# Patient Record
Sex: FEMALE | Race: BLACK OR AFRICAN AMERICAN | NOT HISPANIC OR LATINO | Employment: PART TIME | URBAN - METROPOLITAN AREA
[De-identification: names, ages, dates, MRNs, and addresses within clinical notes are randomized per-mention and may not be internally consistent; named-entity substitution may affect disease eponyms.]

---
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
## 2022-04-10 ENCOUNTER — HISTORICAL (OUTPATIENT)
Dept: ADMINISTRATIVE | Facility: HOSPITAL | Age: 22
End: 2022-04-10
Payer: COMMERCIAL

## 2022-04-28 VITALS
BODY MASS INDEX: 41.95 KG/M2 | DIASTOLIC BLOOD PRESSURE: 78 MMHG | OXYGEN SATURATION: 99 % | WEIGHT: 293 LBS | SYSTOLIC BLOOD PRESSURE: 124 MMHG | HEIGHT: 70 IN

## 2022-05-16 ENCOUNTER — OFFICE VISIT (OUTPATIENT)
Dept: URGENT CARE | Facility: CLINIC | Age: 22
End: 2022-05-16
Payer: COMMERCIAL

## 2022-05-16 VITALS
WEIGHT: 293 LBS | OXYGEN SATURATION: 97 % | HEIGHT: 70 IN | BODY MASS INDEX: 41.95 KG/M2 | DIASTOLIC BLOOD PRESSURE: 88 MMHG | TEMPERATURE: 99 F | SYSTOLIC BLOOD PRESSURE: 120 MMHG | HEART RATE: 66 BPM | RESPIRATION RATE: 20 BRPM

## 2022-05-16 DIAGNOSIS — H60.333 ACUTE SWIMMER'S EAR OF BOTH SIDES: Primary | ICD-10-CM

## 2022-05-16 PROCEDURE — 1159F MED LIST DOCD IN RCRD: CPT | Mod: CPTII,,, | Performed by: NURSE PRACTITIONER

## 2022-05-16 PROCEDURE — 1160F RVW MEDS BY RX/DR IN RCRD: CPT | Mod: CPTII,,, | Performed by: NURSE PRACTITIONER

## 2022-05-16 PROCEDURE — 3074F SYST BP LT 130 MM HG: CPT | Mod: CPTII,,, | Performed by: NURSE PRACTITIONER

## 2022-05-16 PROCEDURE — 3008F PR BODY MASS INDEX (BMI) DOCUMENTED: ICD-10-PCS | Mod: CPTII,,, | Performed by: NURSE PRACTITIONER

## 2022-05-16 PROCEDURE — 3079F PR MOST RECENT DIASTOLIC BLOOD PRESSURE 80-89 MM HG: ICD-10-PCS | Mod: CPTII,,, | Performed by: NURSE PRACTITIONER

## 2022-05-16 PROCEDURE — 99213 OFFICE O/P EST LOW 20 MIN: CPT | Mod: S$PBB,,, | Performed by: NURSE PRACTITIONER

## 2022-05-16 PROCEDURE — 1160F PR REVIEW ALL MEDS BY PRESCRIBER/CLIN PHARMACIST DOCUMENTED: ICD-10-PCS | Mod: CPTII,,, | Performed by: NURSE PRACTITIONER

## 2022-05-16 PROCEDURE — 3074F PR MOST RECENT SYSTOLIC BLOOD PRESSURE < 130 MM HG: ICD-10-PCS | Mod: CPTII,,, | Performed by: NURSE PRACTITIONER

## 2022-05-16 PROCEDURE — 99214 OFFICE O/P EST MOD 30 MIN: CPT | Mod: PBBFAC | Performed by: NURSE PRACTITIONER

## 2022-05-16 PROCEDURE — 1159F PR MEDICATION LIST DOCUMENTED IN MEDICAL RECORD: ICD-10-PCS | Mod: CPTII,,, | Performed by: NURSE PRACTITIONER

## 2022-05-16 PROCEDURE — 3008F BODY MASS INDEX DOCD: CPT | Mod: CPTII,,, | Performed by: NURSE PRACTITIONER

## 2022-05-16 PROCEDURE — 99213 PR OFFICE/OUTPT VISIT, EST, LEVL III, 20-29 MIN: ICD-10-PCS | Mod: S$PBB,,, | Performed by: NURSE PRACTITIONER

## 2022-05-16 PROCEDURE — 3079F DIAST BP 80-89 MM HG: CPT | Mod: CPTII,,, | Performed by: NURSE PRACTITIONER

## 2022-05-16 RX ORDER — ACETIC ACID 20.65 MG/ML
4 SOLUTION AURICULAR (OTIC) 3 TIMES DAILY
Qty: 15 ML | Refills: 0 | Status: SHIPPED | OUTPATIENT
Start: 2022-05-16 | End: 2022-05-23

## 2022-05-16 RX ORDER — NEOMYCIN SULFATE, POLYMYXIN B SULFATE, HYDROCORTISONE 3.5; 10000; 1 MG/ML; [USP'U]/ML; MG/ML
2 SOLUTION/ DROPS AURICULAR (OTIC)
COMMUNITY
Start: 2022-03-28 | End: 2022-05-16 | Stop reason: HOSPADM

## 2022-05-16 RX ORDER — LISDEXAMFETAMINE DIMESYLATE 30 MG/1
30 CAPSULE ORAL
COMMUNITY

## 2022-05-16 RX ORDER — AMOXICILLIN AND CLAVULANATE POTASSIUM 875; 125 MG/1; MG/1
1 TABLET, FILM COATED ORAL EVERY 12 HOURS
Qty: 14 TABLET | Refills: 0 | Status: SHIPPED | OUTPATIENT
Start: 2022-05-16 | End: 2022-05-23

## 2022-05-16 NOTE — PROGRESS NOTES
"Subjective:       Patient ID: Bianca Razo is a 21 y.o. female.    Vitals:  height is 5' 9.69" (1.77 m) and weight is 144.4 kg (318 lb 5.5 oz) (abnormal). Her temperature is 98.5 °F (36.9 °C). Her blood pressure is 120/88 and her pulse is 66. Her respiration is 20 and oxygen saturation is 97%.     Chief Complaint: Ear Problem (Entered by patient) and Otalgia (In both ears. Patients states every few months has ear pain. Went to ER 6 months ago, was prescribe abx drop which helped. Tried those same drop x2 days no relief this time. )    States went swimming at someone's house recently. Was using Polymyxin B Trimeth ear drops x 2 days, no relief. R ear pain > Left. No fevers.    Otalgia   There is pain in both ears. This is a new problem. The current episode started in the past 7 days. The problem occurs constantly. The problem has been gradually worsening. There has been no fever. The pain is moderate. Pertinent negatives include no ear discharge. She has tried ear drops for the symptoms. The treatment provided no relief. There is no history of a chronic ear infection, hearing loss or a tympanostomy tube.       HENT: Positive for ear pain. Negative for ear discharge.        Objective:      Physical Exam   Constitutional: She is cooperative.  Non-toxic appearance. She does not appear ill.   HENT:   Ears:   Right Ear: Tympanic membrane normal. There is drainage.   Left Ear: There is drainage, swelling and cerumen present.   Ears:    Abdominal: Normal appearance.   Neurological: She is alert.         Assessment:       No diagnosis found.    Otitis Externa, both ears  Swimmer's Ear    Plan:       Stop polymyxin B drops.  New Rx sent.  If no improvement by Wednesday, RTC for recheck.    There are no diagnoses linked to this encounter.               "

## 2022-05-16 NOTE — LETTER
May 16, 2022      [facility]  [address] [address]  Phone: [phone]       Patient: Bianca Razo   YOB: 2000  Date of Visit: 05/16/2022    To Whom It May Concern:    Lore Razo  was at [facility] on 05/16/2022. The patient may return to work/school on 05/18/2022 with no restrictions. If you have any questions or concerns, or if I can be of further assistance, please do not hesitate to contact me.    Sincerely,  YADIEL FERNANDEZ NP

## 2022-05-17 ENCOUNTER — HOSPITAL ENCOUNTER (EMERGENCY)
Facility: HOSPITAL | Age: 22
Discharge: HOME | End: 2022-05-17
Attending: EMERGENCY MEDICINE
Payer: COMMERCIAL

## 2022-05-17 VITALS
RESPIRATION RATE: 18 BRPM | SYSTOLIC BLOOD PRESSURE: 139 MMHG | WEIGHT: 293 LBS | HEIGHT: 69 IN | HEART RATE: 89 BPM | DIASTOLIC BLOOD PRESSURE: 98 MMHG | BODY MASS INDEX: 43.4 KG/M2 | OXYGEN SATURATION: 98 % | TEMPERATURE: 99 F

## 2022-05-17 VITALS
OXYGEN SATURATION: 99 % | WEIGHT: 293 LBS | BODY MASS INDEX: 43.4 KG/M2 | TEMPERATURE: 99 F | RESPIRATION RATE: 16 BRPM | HEIGHT: 69 IN | SYSTOLIC BLOOD PRESSURE: 117 MMHG | DIASTOLIC BLOOD PRESSURE: 76 MMHG | HEART RATE: 75 BPM

## 2022-05-17 DIAGNOSIS — H66.91 RIGHT OTITIS MEDIA, UNSPECIFIED OTITIS MEDIA TYPE: Primary | ICD-10-CM

## 2022-05-17 DIAGNOSIS — H60.311 ACUTE DIFFUSE OTITIS EXTERNA OF RIGHT EAR: Primary | ICD-10-CM

## 2022-05-17 DIAGNOSIS — H92.01 RIGHT EAR PAIN: ICD-10-CM

## 2022-05-17 LAB
B-HCG UR QL: NEGATIVE
CTP QC/QA: YES

## 2022-05-17 PROCEDURE — 99284 EMERGENCY DEPT VISIT MOD MDM: CPT | Mod: 25,27

## 2022-05-17 PROCEDURE — 99283 EMERGENCY DEPT VISIT LOW MDM: CPT

## 2022-05-17 PROCEDURE — 81025 URINE PREGNANCY TEST: CPT | Performed by: PHYSICIAN ASSISTANT

## 2022-05-17 RX ORDER — ACETAMINOPHEN AND CODEINE PHOSPHATE 300; 30 MG/1; MG/1
1 TABLET ORAL EVERY 8 HOURS PRN
Qty: 12 TABLET | Refills: 0 | Status: SHIPPED | OUTPATIENT
Start: 2022-05-17 | End: 2022-05-22

## 2022-05-17 RX ORDER — OFLOXACIN 3 MG/ML
10 SOLUTION AURICULAR (OTIC) 2 TIMES DAILY
Qty: 9.34 ML | Refills: 0 | Status: SHIPPED | OUTPATIENT
Start: 2022-05-17 | End: 2022-05-24

## 2022-05-17 RX ORDER — CIPROFLOXACIN 0.5 MG/.25ML
4 SOLUTION/ DROPS AURICULAR (OTIC) 2 TIMES DAILY
Qty: 20 EACH | Refills: 0 | Status: SHIPPED | OUTPATIENT
Start: 2022-05-17 | End: 2022-05-27

## 2022-05-17 NOTE — ED PROVIDER NOTES
"Encounter Date: 5/17/2022       History     Chief Complaint   Patient presents with    Otalgia     Bilateral ear pain "for a few months".      Patient reports bilateral ear pain for the past x3 days; pt was seen at an Urgent Care yesterday and placed on antibiotics, but she is requesting pain meds; pt denies fever    The history is provided by the patient.   Otalgia  This is a recurrent problem. The current episode started several weeks ago. There is pain in both ears. The problem occurs occasionally. The problem has been unchanged. The pain is at a severity of 2/10. Associated symptoms include ear discharge. Pertinent negatives include no headaches, no hearing loss, no rhinorrhea, no sore throat, no abdominal pain, no diarrhea, no vomiting and no rash.     Review of patient's allergies indicates:  No Known Allergies  Past Medical History:   Diagnosis Date    ADHD      No past surgical history on file.  Family History   Problem Relation Age of Onset    No Known Problems Mother     No Known Problems Father      Social History     Tobacco Use    Smoking status: Never Smoker    Smokeless tobacco: Never Used   Substance Use Topics    Alcohol use: Never    Drug use: Never     Review of Systems   Constitutional: Negative for fever.   HENT: Positive for ear discharge and ear pain. Negative for hearing loss, rhinorrhea and sore throat.    Eyes: Negative.    Respiratory: Negative for shortness of breath.    Cardiovascular: Negative for chest pain.   Gastrointestinal: Negative for abdominal pain, diarrhea, nausea and vomiting.   Genitourinary: Negative for dysuria.   Musculoskeletal: Negative for back pain.   Skin: Negative for rash.   Neurological: Negative for weakness and headaches.   Hematological: Does not bruise/bleed easily.   Psychiatric/Behavioral: Negative.        Physical Exam     Initial Vitals   BP Pulse Resp Temp SpO2   05/17/22 1222 05/17/22 1220 05/17/22 1220 05/17/22 1220 05/17/22 1220   121/85 91 18 " 98.6 °F (37 °C) 99 %      MAP       --                Physical Exam    Constitutional: She appears well-developed and well-nourished.   HENT:   Head: Normocephalic and atraumatic.   Right Ear: Hearing normal. There is drainage. No swelling or tenderness. No foreign bodies. Tympanic membrane is erythematous.   Left Ear: Hearing, tympanic membrane, external ear and ear canal normal.   Eyes: EOM are normal.   Neck: Neck supple.   Normal range of motion.  Cardiovascular: Normal rate and regular rhythm.   Pulmonary/Chest: Breath sounds normal.   Abdominal: Abdomen is soft. Bowel sounds are normal.   Musculoskeletal:         General: Normal range of motion.      Cervical back: Normal range of motion and neck supple.     Neurological: She is alert and oriented to person, place, and time.   Skin: Skin is warm and dry.   Psychiatric: She has a normal mood and affect. Her behavior is normal. Judgment and thought content normal.         ED Course   Procedures  Labs Reviewed   POCT URINE PREGNANCY          Imaging Results    None          Medications - No data to display                       Clinical Impression:   Final diagnoses:  [H66.91] Right otitis media, unspecified otitis media type (Primary)  [H92.01] Right ear pain          ED Disposition Condition    Discharge Stable        ED Prescriptions     Medication Sig Dispense Start Date End Date Auth. Provider    acetaminophen-codeine 300-30mg (TYLENOL #3) 300-30 mg Tab Take 1 tablet by mouth every 8 (eight) hours as needed (pain). 12 tablet 5/17/2022 5/22/2022 DIMAS Granado    ciprofloxacin HCl 0.2 % otic solution Place 4 drops into both ears 2 (two) times daily. for 10 days 20 each 5/17/2022 5/27/2022 DIMAS Granado        Follow-up Information     Follow up With Specialties Details Why Contact Info    discharge followup    If your symptoms become WORSE or you DO NOT IMPROVE and you are unable to reach your health care provider, you should RETURN to the  emergency department    discharge info    Discussed all pertinent ED information, results, diagnosis and treatment plan; All questions and concerns were addressed at this time. Patient voices understanding of information and instructions. Patient is comfortable with plan and discharge           DIMAS Granado  05/17/22 0808

## 2022-05-17 NOTE — ED PROVIDER NOTES
Encounter Date: 5/17/2022       History     Chief Complaint   Patient presents with    Otalgia     Pt seen at [facility] and urgent care today for right ear infection. Pt reports that cipro drops for ear was not covered by insurance and is still hurting. Pt needs new drops for ear.      21-year-old female presents to ER stating that she was seen at [facility] ER earlier today and prescribed ciprofloxacin HC 4 and otitis externa.  She states she is unable to afford the medication.  She reports trying to get back in touch with them without success.  She is requesting a different ear drop.    The history is provided by the patient. No  was used.     Review of patient's allergies indicates:  No Known Allergies  Past Medical History:   Diagnosis Date    ADHD      No past surgical history on file.  Family History   Problem Relation Age of Onset    No Known Problems Mother     No Known Problems Father      Social History     Tobacco Use    Smoking status: Never Smoker    Smokeless tobacco: Never Used   Substance Use Topics    Alcohol use: Never    Drug use: Never     Review of Systems   Constitutional: Negative for chills and fever.   HENT: Positive for ear pain.    Respiratory: Negative for cough.    Gastrointestinal: Negative for vomiting.   Musculoskeletal: Negative for neck stiffness.   Neurological: Negative for dizziness.       Physical Exam     Initial Vitals [05/17/22 1637]   BP Pulse Resp Temp SpO2   (!) 139/98 89 18 98.8 °F (37.1 °C) 98 %      MAP       --         Physical Exam    Constitutional: She appears well-developed and well-nourished.   HENT:   Head: Normocephalic.   Right Ear: There is drainage, swelling and tenderness. Tympanic membrane is erythematous and retracted. Tympanic membrane is not perforated.   Eyes: Pupils are equal, round, and reactive to light.   Neck: Neck supple.   Cardiovascular: Normal rate.   Pulmonary/Chest: Breath sounds normal.   Abdominal: Abdomen is soft.    Musculoskeletal:         General: Normal range of motion.      Cervical back: Neck supple.     Neurological: She is alert and oriented to person, place, and time.   Skin: Skin is warm and dry.   Psychiatric: She has a normal mood and affect.         ED Course   Procedures  Labs Reviewed - No data to display       Imaging Results    None          Medications - No data to display  Medical Decision Making:   Differential Diagnosis:   Otitis media, otitis externa  ED Management:  Patient is already on Augmentin but unable to afford the ear drops for the otitis externa.  Will prescribe ofloxacin and have given her a good Rx card.  Patient instructed to continue amoxicillin                      Clinical Impression:   Final diagnoses:  [H60.311] Acute diffuse otitis externa of right ear (Primary)          ED Disposition Condition    Discharge Stable        ED Prescriptions     Medication Sig Dispense Start Date End Date Auth. Provider    ofloxacin (FLOXIN) 0.3 % otic solution Place 10 drops into the right ear 2 (two) times daily. for 7 days 9.34 mL 5/17/2022 5/24/2022 JAVID Alston        Follow-up Information    None          JAVID Alston  05/17/22 1875

## 2022-05-19 ENCOUNTER — HOSPITAL ENCOUNTER (EMERGENCY)
Facility: HOSPITAL | Age: 22
Discharge: HOME | End: 2022-05-19
Attending: EMERGENCY MEDICINE
Payer: COMMERCIAL

## 2022-05-19 VITALS
RESPIRATION RATE: 18 BRPM | OXYGEN SATURATION: 97 % | HEART RATE: 77 BPM | SYSTOLIC BLOOD PRESSURE: 152 MMHG | TEMPERATURE: 99 F | DIASTOLIC BLOOD PRESSURE: 86 MMHG

## 2022-05-19 DIAGNOSIS — H60.63 CHRONIC OTITIS EXTERNA OF BOTH EARS, UNSPECIFIED TYPE: Primary | ICD-10-CM

## 2022-05-19 PROCEDURE — 99283 EMERGENCY DEPT VISIT LOW MDM: CPT

## 2022-05-19 RX ORDER — DICLOFENAC SODIUM 50 MG/1
50 TABLET, DELAYED RELEASE ORAL 3 TIMES DAILY
Qty: 21 TABLET | Refills: 0 | Status: SHIPPED | OUTPATIENT
Start: 2022-05-19 | End: 2022-05-26

## 2022-05-19 RX ORDER — HYDROCODONE BITARTRATE AND ACETAMINOPHEN 5; 325 MG/1; MG/1
1 TABLET ORAL
Status: DISCONTINUED | OUTPATIENT
Start: 2022-05-19 | End: 2022-05-19 | Stop reason: HOSPADM

## 2022-05-19 NOTE — DISCHARGE INSTRUCTIONS
Continue on oral and ear drop antibiotics.    You have been prescribed diclofenac for pain. This is an Non-Steroidal Anti-Inflammatory (NSAID) Medication. Please do not take any additional NSAIDs while you are taking this medication including (Advil, Aleve, Motrin, Ibuprofen, Mobic\meloxicam, Naprosyn, Toradol, ketoralac, etc.). Please stop taking this medication if you experience: weakness, itching, yellow skin or eyes, joint pains, vomiting blood, blood or black stools, unusual weight gain, or swelling in your arms, legs, hands, or feet.     While in the Emergency Department you received medication that may cause drowsiness, dizziness, impaired judgment, and reduced physical capabilities. You should not drive, operate heavy machinery, swim, or make life  changing decisions within 24 hours of receiving this medication.

## 2022-05-19 NOTE — ED PROVIDER NOTES
Encounter Date: 5/19/2022       History     Chief Complaint   Patient presents with    Otalgia     Pt to ER via POV for bilateral ear pain.  Started months ago.  Pt states she is still in pain and needs a referral to the ENT     23 yo female presents to ED for evaluation of bilateral pain and drainage for months. States has been seen multiple times without relief. Denies any fever. Currently on amoxicilin and ofloxacin. Attempted to get into ENT but needs referral.         Review of patient's allergies indicates:  No Known Allergies  Past Medical History:   Diagnosis Date    ADHD      History reviewed. No pertinent surgical history.  Family History   Problem Relation Age of Onset    No Known Problems Mother     No Known Problems Father      Social History     Tobacco Use    Smoking status: Never Smoker    Smokeless tobacco: Never Used   Substance Use Topics    Alcohol use: Never    Drug use: Never     Review of Systems   Constitutional: Negative for fatigue and fever.   HENT: Positive for ear discharge and ear pain. Negative for congestion, rhinorrhea and sore throat.    Respiratory: Negative for cough, shortness of breath and wheezing.    Gastrointestinal: Negative for abdominal pain, nausea and vomiting.   Musculoskeletal: Negative for myalgias.   Neurological: Negative for headaches.       Physical Exam     Initial Vitals [05/19/22 1106]   BP Pulse Resp Temp SpO2   (!) 152/86 77 18 98.6 °F (37 °C) 97 %      MAP       --         Physical Exam    Nursing note and vitals reviewed.  Constitutional: She appears well-developed.   HENT:   Head: Normocephalic and atraumatic.   Right Ear: There is drainage, swelling and tenderness. Tympanic membrane is erythematous. Tympanic membrane is not perforated.   Left Ear: There is drainage, swelling and tenderness. Tympanic membrane is erythematous. Tympanic membrane is not perforated.   Neck: Neck supple.   Normal range of motion.  Cardiovascular: Normal rate, regular  rhythm and normal heart sounds.   Pulmonary/Chest: Breath sounds normal. She has no wheezes. She has no rhonchi. She has no rales.   Abdominal: Abdomen is soft. Bowel sounds are normal. There is no abdominal tenderness.   Musculoskeletal:         General: Normal range of motion.      Cervical back: Normal range of motion and neck supple.     Neurological: She is alert and oriented to person, place, and time.   Skin: Skin is warm and dry.   Psychiatric: She has a normal mood and affect.         ED Course   Procedures  Labs Reviewed - No data to display       Imaging Results    None          Medications   HYDROcodone-acetaminophen 5-325 mg per tablet 1 tablet (has no administration in time range)                 ED Course as of 05/19/22 1136   Thu May 19, 2022   1136 Patient currently on antibiotics. Will give short course of diclofenac. Will sent referral to ENT.  [SL]      ED Course User Index  [SL] DIMAS Kidd             Clinical Impression:   Final diagnoses:  [H60.63] Chronic otitis externa of both ears, unspecified type (Primary)          ED Disposition Condition    Discharge Stable        ED Prescriptions     None        Follow-up Information     Follow up With Specialties Details Why Contact Info    Jeremy Hurtado MD Otolaryngology   [address] [facility] [address] [facility] [address]  [phone]             DIMAS Kidd  05/19/22 5446

## 2022-08-24 ENCOUNTER — HOSPITAL ENCOUNTER (EMERGENCY)
Facility: HOSPITAL | Age: 22
Discharge: HOME | End: 2022-08-24
Attending: EMERGENCY MEDICINE
Payer: MEDICAID

## 2022-08-24 VITALS
HEART RATE: 74 BPM | WEIGHT: 293 LBS | BODY MASS INDEX: 41.95 KG/M2 | TEMPERATURE: 99 F | DIASTOLIC BLOOD PRESSURE: 84 MMHG | SYSTOLIC BLOOD PRESSURE: 126 MMHG | HEIGHT: 70 IN | RESPIRATION RATE: 18 BRPM | OXYGEN SATURATION: 99 %

## 2022-08-24 DIAGNOSIS — N30.01 ACUTE CYSTITIS WITH HEMATURIA: Primary | ICD-10-CM

## 2022-08-24 DIAGNOSIS — R11.0 NAUSEA: ICD-10-CM

## 2022-08-24 LAB
ALBUMIN SERPL-MCNC: 3.8 GM/DL (ref 3.5–5)
ALBUMIN/GLOB SERPL: 1 RATIO (ref 1.1–2)
ALP SERPL-CCNC: 76 UNIT/L (ref 40–150)
ALT SERPL-CCNC: 10 UNIT/L (ref 0–55)
APPEARANCE UR: ABNORMAL
AST SERPL-CCNC: 12 UNIT/L (ref 5–34)
B-HCG UR QL: NEGATIVE
BACTERIA #/AREA URNS AUTO: ABNORMAL /HPF
BASOPHILS # BLD AUTO: 0.02 X10(3)/MCL (ref 0–0.2)
BASOPHILS NFR BLD AUTO: 0.2 %
BILIRUB UR QL STRIP.AUTO: NEGATIVE MG/DL
BILIRUBIN DIRECT+TOT PNL SERPL-MCNC: 0.2 MG/DL
BUN SERPL-MCNC: 9.2 MG/DL (ref 7–18.7)
CALCIUM SERPL-MCNC: 9.7 MG/DL (ref 8.4–10.2)
CHLORIDE SERPL-SCNC: 106 MMOL/L (ref 98–107)
CO2 SERPL-SCNC: 27 MMOL/L (ref 22–29)
COLOR UR AUTO: ABNORMAL
CREAT SERPL-MCNC: 0.81 MG/DL (ref 0.55–1.02)
CTP QC/QA: YES
EOSINOPHIL # BLD AUTO: 0.06 X10(3)/MCL (ref 0–0.9)
EOSINOPHIL NFR BLD AUTO: 0.6 %
ERYTHROCYTE [DISTWIDTH] IN BLOOD BY AUTOMATED COUNT: 14.1 % (ref 11.5–17)
GFR SERPLBLD CREATININE-BSD FMLA CKD-EPI: >60 MLS/MIN/1.73/M2
GLOBULIN SER-MCNC: 3.8 GM/DL (ref 2.4–3.5)
GLUCOSE SERPL-MCNC: 111 MG/DL (ref 74–100)
GLUCOSE UR QL STRIP.AUTO: NORMAL MG/DL
HCT VFR BLD AUTO: 36.5 % (ref 37–47)
HGB BLD-MCNC: 11.5 GM/DL (ref 12–16)
HYALINE CASTS #/AREA URNS LPF: ABNORMAL /LPF
IMM GRANULOCYTES # BLD AUTO: 0.03 X10(3)/MCL (ref 0–0.04)
IMM GRANULOCYTES NFR BLD AUTO: 0.3 %
KETONES UR QL STRIP.AUTO: NEGATIVE MG/DL
LEUKOCYTE ESTERASE UR QL STRIP.AUTO: 500 UNIT/L
LYMPHOCYTES # BLD AUTO: 2.44 X10(3)/MCL (ref 0.6–4.6)
LYMPHOCYTES NFR BLD AUTO: 23.8 %
MCH RBC QN AUTO: 27.8 PG (ref 27–31)
MCHC RBC AUTO-ENTMCNC: 31.5 MG/DL (ref 33–36)
MCV RBC AUTO: 88.4 FL (ref 80–94)
MONOCYTES # BLD AUTO: 0.5 X10(3)/MCL (ref 0.1–1.3)
MONOCYTES NFR BLD AUTO: 4.9 %
MUCOUS THREADS URNS QL MICRO: ABNORMAL /LPF
NEUTROPHILS # BLD AUTO: 7.2 X10(3)/MCL (ref 2.1–9.2)
NEUTROPHILS NFR BLD AUTO: 70.2 %
NITRITE UR QL STRIP.AUTO: ABNORMAL
NRBC BLD AUTO-RTO: 0 %
PH UR STRIP.AUTO: 7 [PH]
PLATELET # BLD AUTO: 318 X10(3)/MCL (ref 130–400)
PMV BLD AUTO: 9.9 FL (ref 7.4–10.4)
POTASSIUM SERPL-SCNC: 3.9 MMOL/L (ref 3.5–5.1)
PROT SERPL-MCNC: 7.6 GM/DL (ref 6.4–8.3)
PROT UR QL STRIP.AUTO: ABNORMAL MG/DL
RBC # BLD AUTO: 4.13 X10(6)/MCL (ref 4.2–5.4)
RBC UR QL AUTO: ABNORMAL UNIT/L
SODIUM SERPL-SCNC: 141 MMOL/L (ref 136–145)
SP GR UR STRIP.AUTO: 1.02
SQUAMOUS #/AREA URNS LPF: ABNORMAL /HPF
UNIDENT CRYS #/AREA URNS HPF: ABNORMAL /HPF
UROBILINOGEN UR STRIP-ACNC: NORMAL MG/DL
WBC # SPEC AUTO: 10.2 X10(3)/MCL (ref 4.5–11.5)
WBC #/AREA URNS AUTO: >100 /HPF
WBC CLUMPS UR QL AUTO: ABNORMAL /HPF

## 2022-08-24 PROCEDURE — 63600175 PHARM REV CODE 636 W HCPCS: Performed by: PHYSICIAN ASSISTANT

## 2022-08-24 PROCEDURE — 80053 COMPREHEN METABOLIC PANEL: CPT | Performed by: PHYSICIAN ASSISTANT

## 2022-08-24 PROCEDURE — 36415 COLL VENOUS BLD VENIPUNCTURE: CPT | Performed by: PHYSICIAN ASSISTANT

## 2022-08-24 PROCEDURE — 81025 URINE PREGNANCY TEST: CPT | Performed by: PHYSICIAN ASSISTANT

## 2022-08-24 PROCEDURE — 96372 THER/PROPH/DIAG INJ SC/IM: CPT | Performed by: PHYSICIAN ASSISTANT

## 2022-08-24 PROCEDURE — 99285 EMERGENCY DEPT VISIT HI MDM: CPT | Mod: 25

## 2022-08-24 PROCEDURE — 85025 COMPLETE CBC W/AUTO DIFF WBC: CPT | Performed by: PHYSICIAN ASSISTANT

## 2022-08-24 PROCEDURE — 81001 URINALYSIS AUTO W/SCOPE: CPT | Performed by: PHYSICIAN ASSISTANT

## 2022-08-24 PROCEDURE — 87077 CULTURE AEROBIC IDENTIFY: CPT | Performed by: PHYSICIAN ASSISTANT

## 2022-08-24 RX ORDER — CEPHALEXIN 500 MG/1
500 CAPSULE ORAL EVERY 8 HOURS
Qty: 15 CAPSULE | Refills: 0 | Status: SHIPPED | OUTPATIENT
Start: 2022-08-24 | End: 2022-08-29

## 2022-08-24 RX ORDER — PHENAZOPYRIDINE HYDROCHLORIDE 200 MG/1
200 TABLET, FILM COATED ORAL 3 TIMES DAILY
Qty: 9 TABLET | Refills: 0 | Status: SHIPPED | OUTPATIENT
Start: 2022-08-24 | End: 2022-08-27

## 2022-08-24 RX ORDER — CEFTRIAXONE 500 MG/1
500 INJECTION, POWDER, FOR SOLUTION INTRAMUSCULAR; INTRAVENOUS
Status: COMPLETED | OUTPATIENT
Start: 2022-08-24 | End: 2022-08-24

## 2022-08-24 RX ORDER — ONDANSETRON 4 MG/1
4 TABLET, FILM COATED ORAL EVERY 6 HOURS
Qty: 12 TABLET | Refills: 0 | Status: SHIPPED | OUTPATIENT
Start: 2022-08-24 | End: 2023-07-25 | Stop reason: ALTCHOICE

## 2022-08-24 RX ADMIN — CEFTRIAXONE SODIUM 500 MG: 500 INJECTION, POWDER, FOR SOLUTION INTRAMUSCULAR; INTRAVENOUS at 11:08

## 2022-08-24 NOTE — Clinical Note
"Bianca Durantilsa" Danni was seen and treated in our emergency department on 8/24/2022.  She may return to work on 08/26/2022.       If you have any questions or concerns, please don't hesitate to call.      Madison Hill PA-C"

## 2022-08-25 NOTE — ED PROVIDER NOTES
Encounter Date: 8/24/2022       History     Chief Complaint   Patient presents with    Abdominal Pain     Pt reports right flank pain, dysuria, hematuria, and nausea x 2 weeks. Pt denies fever.No acute distress noted at this time.     Bianca Razo is a 22 y.o. female who presents to the ED with complaints of right flank pain, dysuria, hematuria, and nausea that started 2 week(s) ago. She denies vomiting and diarrhea, fever, and chills. She reports mild nausea.        The history is provided by the patient. No  was used.     Review of patient's allergies indicates:  No Known Allergies  Past Medical History:   Diagnosis Date    ADHD      No past surgical history on file.  Family History   Problem Relation Age of Onset    No Known Problems Mother     No Known Problems Father      Social History     Tobacco Use    Smoking status: Never Smoker    Smokeless tobacco: Never Used   Substance Use Topics    Alcohol use: Never    Drug use: Never     Review of Systems   Constitutional: Negative for chills, fatigue and fever.   HENT: Negative for congestion, ear pain, sinus pain and sore throat.    Eyes: Negative for pain.   Respiratory: Negative for cough, chest tightness and shortness of breath.    Cardiovascular: Negative for chest pain.   Gastrointestinal: Negative for abdominal pain, constipation, diarrhea, nausea and vomiting.   Genitourinary: Positive for dysuria, flank pain and hematuria. Negative for pelvic pain, urgency and vaginal pain.   Musculoskeletal: Negative for back pain and joint swelling.   Skin: Negative for color change and rash.   Neurological: Negative for dizziness and weakness.   Psychiatric/Behavioral: Negative for behavioral problems and confusion.       Physical Exam     Initial Vitals [08/24/22 2056]   BP Pulse Resp Temp SpO2   134/82 88 20 98.7 °F (37.1 °C) 100 %      MAP       --         Physical Exam    Constitutional: She appears well-developed and well-nourished.    HENT:   Head: Normocephalic and atraumatic.   Nose: Nose normal.   Eyes: EOM are normal. Pupils are equal, round, and reactive to light.   Neck: Neck supple. No thyromegaly present. No JVD present.   Normal range of motion.  Cardiovascular: Normal rate, regular rhythm, normal heart sounds and intact distal pulses.   No murmur heard.  Pulmonary/Chest: Breath sounds normal. No respiratory distress. She has no wheezes. She has no rhonchi. She has no rales. She exhibits no tenderness.   Abdominal: Abdomen is soft. Bowel sounds are normal. She exhibits no distension. There is abdominal tenderness in the suprapubic area.   There is right CVA tenderness.  No left CVA tenderness. There is no rebound, no guarding, no tenderness at McBurney's point and negative Washington's sign. negative obturator sign, negative psoas sign and negative Rovsing's sign  Musculoskeletal:         General: No tenderness or edema. Normal range of motion.      Cervical back: Normal range of motion and neck supple.     Lymphadenopathy:     She has no cervical adenopathy.   Neurological: She is alert and oriented to person, place, and time.   Skin: Skin is warm and dry. Capillary refill takes less than 2 seconds.   Psychiatric: She has a normal mood and affect. Thought content normal.         ED Course   Procedures  Labs Reviewed   COMPREHENSIVE METABOLIC PANEL - Abnormal; Notable for the following components:       Result Value    Glucose Level 111 (*)     Globulin 3.8 (*)     Albumin/Globulin Ratio 1.0 (*)     All other components within normal limits   URINALYSIS, REFLEX TO URINE CULTURE - Abnormal; Notable for the following components:    Color, UA Dark-Yellow (*)     Appearance, UA Turbid (*)     Protein, UA 1+ (*)     Blood, UA 3+ (*)     Nitrites, UA 2+ (*)     Leukocyte Esterase,   (*)     WBC, UA >100 (*)     WBC Clumps, UA Many (*)     Bacteria, UA Moderate (*)     Squamous Epithelial Cells, UA Many (*)     Mucous, UA Occ (*)      Unclassified Crystal, UA Occ (*)     All other components within normal limits   CBC WITH DIFFERENTIAL - Abnormal; Notable for the following components:    RBC 4.13 (*)     Hgb 11.5 (*)     Hct 36.5 (*)     MCHC 31.5 (*)     All other components within normal limits   CULTURE, URINE   CBC W/ AUTO DIFFERENTIAL    Narrative:     The following orders were created for panel order CBC auto differential.  Procedure                               Abnormality         Status                     ---------                               -----------         ------                     CBC with Differential[[id number]]        Abnormal            Final result                 Please view results for these tests on the individual orders.   POCT URINE PREGNANCY          Imaging Results          CT Abdomen Pelvis  Without Contrast (Final result)  Result time 08/24/22 23:00:20    Final result by Blake Baumann MD (08/24/22 23:00:20)                 Impression:      No acute abnormality of the abdomen and pelvis.      Electronically signed by: Blake Baumann MD  Date:    08/24/2022  Time:    23:00             Narrative:    EXAMINATION:  CT ABDOMEN PELVIS WITHOUT CONTRAST    CLINICAL HISTORY:  Flank pain, kidney stone suspected;    TECHNIQUE:  Axial images of the abdomen and pelvis were obtained without IV contrast administration.  Coronal and sagittal reconstructions were provided.  Three dimensional and MIP images were obtained and evaluated.  Total DLP was 7 of 3 mGy-cm. Dose lowering technique and automated exposure control were utilized for this exam.    COMPARISON:  None    FINDINGS:  The bilateral lung bases are clear.  The heart is normal in size.    The liver is homogeneous in attenuation.  The gallbladder, spleen, pancreas, and adrenal glands are normal.  The bilateral kidneys are normal.  There is no hydronephrosis or nephrolithiasis.  There is no urolithiasis.  The urinary bladder is normal.    The stomach and small bowel are  decompressed.  There is no bowel obstruction.  The appendix is normal.  The colon is normal.  The uterus and adnexa are normal.  There is no pelvic or retroperitoneal adenopathy.  The aorta is nonaneurysmal.  There is no lytic or blastic osseous lesion.                                 Medications   cefTRIAXone injection 500 mg (has no administration in time range)                 ED Course as of 08/24/22 2315   Wed Aug 24, 2022   2312 Reassessed patient at this time. She is sitting comfortably in the exam bed. Discussed lab results, CT scan, diagnosis, and treatment plan with her. She verbalized understanding. Will give IM Rocephin and DC home with antibiotics. She verbalized understanding. Strict return precautions given. Stable for discharge.  [VJ]      ED Course User Index  [VJ] Madison Hill PA-C             Clinical Impression:   Final diagnoses:  [N30.01] Acute cystitis with hematuria (Primary)  [R11.0] Nausea          ED Disposition Condition    Discharge Stable        ED Prescriptions     Medication Sig Dispense Start Date End Date Auth. Provider    cephALEXin (KEFLEX) 500 MG capsule Take 1 capsule (500 mg total) by mouth every 8 (eight) hours. for 5 days 15 capsule 8/24/2022 8/29/2022 Madison Hill PA-C    ondansetron (ZOFRAN) 4 MG tablet Take 1 tablet (4 mg total) by mouth every 6 (six) hours. 12 tablet 8/24/2022  Madison Hill PA-C    phenazopyridine (PYRIDIUM) 200 MG tablet Take 1 tablet (200 mg total) by mouth 3 (three) times daily. for 3 days 9 tablet 8/24/2022 8/27/2022 Madison Hill PA-C        Follow-up Information     Follow up With Specialties Details Why Contact Info    Perlita Garcia NP Urgent Care, Emergency Medicine   [address] [place] [id number] [phone]      [facility] Emergency Medicine In 3 days As needed, If symptoms worsen [address] [facility] [address] [phone]           Madison Hill  GINO  08/24/22 4012

## 2022-08-29 LAB
BACTERIA UR CULT: ABNORMAL
BACTERIA UR CULT: ABNORMAL

## 2022-08-30 ENCOUNTER — TELEPHONE (OUTPATIENT)
Dept: EMERGENCY MEDICINE | Facility: HOSPITAL | Age: 22
End: 2022-08-30
Payer: MEDICAID

## 2022-09-01 ENCOUNTER — LAB VISIT (OUTPATIENT)
Dept: LAB | Facility: HOSPITAL | Age: 22
End: 2022-09-01
Attending: OBSTETRICS & GYNECOLOGY
Payer: MEDICAID

## 2022-09-01 DIAGNOSIS — Z20.2 VENEREAL DISEASE CONTACT: Primary | ICD-10-CM

## 2022-09-01 LAB
HIV 1+2 AB+HIV1 P24 AG SERPL QL IA: NONREACTIVE
T PALLIDUM AB SER QL: NONREACTIVE

## 2022-09-01 PROCEDURE — 87389 HIV-1 AG W/HIV-1&-2 AB AG IA: CPT

## 2022-09-01 PROCEDURE — 86780 TREPONEMA PALLIDUM: CPT

## 2022-09-01 PROCEDURE — 80074 ACUTE HEPATITIS PANEL: CPT

## 2022-09-01 PROCEDURE — 86696 HERPES SIMPLEX TYPE 2 TEST: CPT

## 2022-09-01 PROCEDURE — 36415 COLL VENOUS BLD VENIPUNCTURE: CPT

## 2022-09-02 LAB
HAV IGM SERPL QL IA: NONREACTIVE
HBV CORE IGM SERPL QL IA: NONREACTIVE
HBV SURFACE AG SERPL QL IA: NONREACTIVE
HCV AB SERPL QL IA: NONREACTIVE
HSV1 IGG SERPL QL IA: NEGATIVE
HSV2 IGG SERPL QL IA: NEGATIVE

## 2022-09-08 LAB — PATH REV: NORMAL

## 2022-09-26 ENCOUNTER — OCCUPATIONAL HEALTH (OUTPATIENT)
Dept: URGENT CARE | Facility: CLINIC | Age: 22
End: 2022-09-26
Payer: MEDICAID

## 2022-09-26 VITALS
SYSTOLIC BLOOD PRESSURE: 114 MMHG | WEIGHT: 293 LBS | HEART RATE: 80 BPM | BODY MASS INDEX: 41.95 KG/M2 | RESPIRATION RATE: 18 BRPM | OXYGEN SATURATION: 99 % | DIASTOLIC BLOOD PRESSURE: 77 MMHG | HEIGHT: 70 IN | TEMPERATURE: 97 F

## 2022-09-26 DIAGNOSIS — Z23 NEED FOR TUBERCULOSIS VACCINATION: Primary | ICD-10-CM

## 2022-09-26 PROCEDURE — 86580 TB INTRADERMAL TEST: CPT | Performed by: FAMILY MEDICINE

## 2022-09-26 PROCEDURE — 30200315 PPD INTRADERMAL TEST REV CODE 302: Performed by: FAMILY MEDICINE

## 2022-09-26 PROCEDURE — 99213 OFFICE O/P EST LOW 20 MIN: CPT | Mod: PBBFAC

## 2022-09-26 RX ADMIN — TUBERCULIN PURIFIED PROTEIN DERIVATIVE 5 UNITS: 5 INJECTION, SOLUTION INTRADERMAL at 08:09

## 2022-09-27 NOTE — PROGRESS NOTES
"Subjective:       Patient ID: Bianca Razo is a 22 y.o. female.    Vitals:  height is 5' 10" (1.778 m) and weight is 144.2 kg (317 lb 12.8 oz) (abnormal). Her temporal temperature is 97.4 °F (36.3 °C). Her blood pressure is 114/77 and her pulse is 80. Her respiration is 18 and oxygen saturation is 99%.     Chief Complaint: PPD Placement  (CORRECTION): MAR says LEFT arm, however placement is in RIGHT arm.  HPI  ROS    Objective:      Physical Exam      Assessment:       1. Need for tuberculosis vaccination            Plan:         Need for tuberculosis vaccination  -     tuberculin injection 5 Units    RIGHT ARM                 "

## 2022-09-28 ENCOUNTER — CLINICAL SUPPORT (OUTPATIENT)
Dept: URGENT CARE | Facility: CLINIC | Age: 22
End: 2022-09-28
Payer: COMMERCIAL

## 2022-09-28 DIAGNOSIS — Z11.1 ENCOUNTER FOR PPD SKIN TEST READING: Primary | ICD-10-CM

## 2022-09-28 LAB
TB INDURATION - 48 HR READ: 0 MM
TB INDURATION - 72 HR READ: NORMAL
TB SKIN TEST - 48 HR READ: NEGATIVE
TB SKIN TEST - 72 HR READ: NORMAL

## 2022-09-28 PROCEDURE — 86580 TB INTRADERMAL TEST: CPT | Mod: PBBFAC

## 2022-09-28 PROCEDURE — 99211 OFF/OP EST MAY X REQ PHY/QHP: CPT | Mod: PBBFAC

## 2022-09-29 NOTE — PROGRESS NOTES
Subjective:       Patient ID: Bianca Razo is a 22 y.o. female.    Vitals:  vitals were not taken for this visit.     Chief Complaint: No chief complaint on file.    HPI  ROS    Objective:      Physical Exam      Assessment:       1. Encounter for PPD skin test reading            Plan:         Encounter for PPD skin test reading  -     POCT TB Skin Test Read

## 2023-07-25 ENCOUNTER — OFFICE VISIT (OUTPATIENT)
Dept: URGENT CARE | Facility: CLINIC | Age: 23
End: 2023-07-25
Payer: MEDICAID

## 2023-07-25 VITALS
HEART RATE: 66 BPM | OXYGEN SATURATION: 100 % | RESPIRATION RATE: 18 BRPM | BODY MASS INDEX: 44.41 KG/M2 | TEMPERATURE: 98 F | DIASTOLIC BLOOD PRESSURE: 71 MMHG | HEIGHT: 68 IN | SYSTOLIC BLOOD PRESSURE: 114 MMHG | WEIGHT: 293 LBS

## 2023-07-25 DIAGNOSIS — Z32.02 NEGATIVE PREGNANCY TEST: ICD-10-CM

## 2023-07-25 DIAGNOSIS — R11.2 NAUSEA AND VOMITING, UNSPECIFIED VOMITING TYPE: Primary | ICD-10-CM

## 2023-07-25 DIAGNOSIS — R19.7 DIARRHEA, UNSPECIFIED TYPE: ICD-10-CM

## 2023-07-25 LAB
B-HCG UR QL: NEGATIVE
BILIRUB UR QL STRIP: NEGATIVE
CTP QC/QA: YES
GLUCOSE UR QL STRIP: NEGATIVE
KETONES UR QL STRIP: NEGATIVE
LEUKOCYTE ESTERASE UR QL STRIP: NEGATIVE
PH, POC UA: 6
POC BLOOD, URINE: NEGATIVE
POC NITRATES, URINE: NEGATIVE
PROT UR QL STRIP: POSITIVE
SARS-COV-2 RNA RESP QL NAA+PROBE: NOT DETECTED
SP GR UR STRIP: 1.02 (ref 1–1.03)
UROBILINOGEN UR STRIP-ACNC: ABNORMAL (ref 0.1–1.1)

## 2023-07-25 PROCEDURE — 99214 PR OFFICE/OUTPT VISIT, EST, LEVL IV, 30-39 MIN: ICD-10-PCS | Mod: S$PBB,,,

## 2023-07-25 PROCEDURE — 99214 OFFICE O/P EST MOD 30 MIN: CPT | Mod: S$PBB,,,

## 2023-07-25 PROCEDURE — 81025 URINE PREGNANCY TEST: CPT | Mod: PBBFAC

## 2023-07-25 PROCEDURE — 99214 OFFICE O/P EST MOD 30 MIN: CPT | Mod: PBBFAC

## 2023-07-25 PROCEDURE — 87635 SARS-COV-2 COVID-19 AMP PRB: CPT

## 2023-07-25 PROCEDURE — 81003 URINALYSIS AUTO W/O SCOPE: CPT | Mod: PBBFAC

## 2023-07-25 RX ORDER — ONDANSETRON 4 MG/1
4 TABLET, ORALLY DISINTEGRATING ORAL EVERY 6 HOURS PRN
Qty: 12 TABLET | Refills: 0 | Status: SHIPPED | OUTPATIENT
Start: 2023-07-25 | End: 2023-07-28

## 2023-07-25 NOTE — PROGRESS NOTES
"Subjective:      Patient ID: Bianca Razo is a 23 y.o. female.    Vitals:  height is 5' 8.11" (1.73 m) and weight is 136.2 kg (300 lb 4.8 oz) (abnormal). Her temperature is 98.1 °F (36.7 °C). Her blood pressure is 114/71 and her pulse is 66. Her respiration is 18 and oxygen saturation is 100%.     Chief Complaint: Vomiting (V/D x 2 days.)    Cc as above. 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 Symptoms are improving and mild.     Vomiting   This is a new problem. The current episode started in the past 7 days. The problem occurs 2 to 4 times per day. The problem has been gradually improving. There has been no fever. Associated symptoms include abdominal pain and diarrhea. Pertinent negatives include no chills or coughing. She has tried nothing for the symptoms.     Constitution: Negative for chills.   Respiratory:  Negative for cough.    Gastrointestinal:  Positive for abdominal pain, vomiting and diarrhea.    Objective:     Physical Exam   Constitutional: She is oriented to person, place, and time. She appears well-developed.  Non-toxic appearance. She does not appear ill. No distress.   HENT:   Head: Normocephalic and atraumatic.   Nose: Nose normal.   Mouth/Throat: Mucous membranes are normal.   Eyes: Conjunctivae and lids are normal.   Neck: Trachea normal. Neck supple.   Cardiovascular: Normal rate, regular rhythm, normal heart sounds and normal pulses.   Pulmonary/Chest: Effort normal and breath sounds normal. No respiratory distress.   Abdominal: Normal appearance and bowel sounds are normal. She exhibits no distension and no mass. Soft. There is abdominal tenderness in the right lower quadrant and left lower quadrant. There is no rebound and no guarding.   Musculoskeletal: Normal range of motion.         General: Normal range of motion.   Neurological: no focal deficit. She is alert and oriented to person, place, and time. She has normal strength.   Skin: Skin is warm, dry, intact, not diaphoretic and not " pale.   Psychiatric: Her speech is normal and behavior is normal. Mood, judgment and thought content normal.   Nursing note and vitals reviewed.    Assessment:     1. Nausea and vomiting, unspecified vomiting type    2. Diarrhea, unspecified type    3. Negative pregnancy test        Plan:       Nausea and vomiting, unspecified vomiting type  -     POCT Urinalysis, Dipstick, Automated, W/O Scope  -     POCT urine pregnancy  -     ondansetron (ZOFRAN-ODT) 4 MG TbDL; Take 1 tablet (4 mg total) by mouth every 6 (six) hours as needed (nausea/vomiting).  Dispense: 12 tablet; Refill: 0  -     COVID-19 Routine Screening    Diarrhea, unspecified type  -     COVID-19 Routine Screening    Negative pregnancy test    Rest. Drink plenty of fluids to stay hydrated. Zofran as needed for nausea/vomiting. Clear liquids today, then slowly resume your usual diet starting with crackers, toast, banana, soup when your appetite returns. ER precautions.

## 2023-07-25 NOTE — PATIENT INSTRUCTIONS
Rest. Drink plenty of fluids to stay hydrated. Zofran as needed for nausea/vomiting. Clear liquids today, then slowly resume your usual diet starting with crackers, toast, banana, soup when your appetite returns. Go to the nearest ER for fever, severe abdominal pain, chest pain, shortness of breath, etc.   COVID testing pending. The clinic will all with abnormal results.

## 2023-07-25 NOTE — LETTER
July 25, 2023      [facility]  [address] [address]  Phone: [phone]       Patient: Bianca Razo   YOB: 2000  Date of Visit: 07/25/2023    To Whom It May Concern:    Lore Razo  was at [facility] on 07/25/2023. The patient may return to work/school on 7/27/23. If you have any questions or concerns, or if I can be of further assistance, please do not hesitate to contact me.    Sincerely,    HALLEY Baxter, NP

## 2023-10-19 ENCOUNTER — HOSPITAL ENCOUNTER (EMERGENCY)
Facility: HOSPITAL | Age: 23
Discharge: HOME | End: 2023-10-19
Attending: INTERNAL MEDICINE
Payer: MEDICAID

## 2023-10-19 VITALS
WEIGHT: 288.56 LBS | SYSTOLIC BLOOD PRESSURE: 136 MMHG | OXYGEN SATURATION: 100 % | BODY MASS INDEX: 42.74 KG/M2 | TEMPERATURE: 97 F | HEIGHT: 69 IN | DIASTOLIC BLOOD PRESSURE: 75 MMHG | HEART RATE: 55 BPM | RESPIRATION RATE: 18 BRPM

## 2023-10-19 DIAGNOSIS — R51.9 NONINTRACTABLE HEADACHE, UNSPECIFIED CHRONICITY PATTERN, UNSPECIFIED HEADACHE TYPE: ICD-10-CM

## 2023-10-19 DIAGNOSIS — N76.0 BACTERIAL VAGINOSIS: Primary | ICD-10-CM

## 2023-10-19 DIAGNOSIS — B96.89 BACTERIAL VAGINOSIS: Primary | ICD-10-CM

## 2023-10-19 LAB
APPEARANCE UR: CLEAR
B-HCG SERPL QL: NEGATIVE
BACTERIA #/AREA URNS AUTO: ABNORMAL /HPF
BILIRUB UR QL STRIP.AUTO: NEGATIVE
C TRACH DNA SPEC QL NAA+PROBE: NOT DETECTED
C TRACH DNA SPEC QL NAA+PROBE: NOT DETECTED
CLUE CELLS VAG QL WET PREP: ABNORMAL
COLOR UR AUTO: ABNORMAL
GLUCOSE UR QL STRIP.AUTO: NORMAL
HYALINE CASTS #/AREA URNS LPF: ABNORMAL /LPF
KETONES UR QL STRIP.AUTO: NEGATIVE
LEUKOCYTE ESTERASE UR QL STRIP.AUTO: NEGATIVE
MUCOUS THREADS URNS QL MICRO: ABNORMAL /LPF
N GONORRHOEA DNA SPEC QL NAA+PROBE: NOT DETECTED
N GONORRHOEA DNA SPEC QL NAA+PROBE: NOT DETECTED
NITRITE UR QL STRIP.AUTO: NEGATIVE
PH UR STRIP.AUTO: 6 [PH]
PROT UR QL STRIP.AUTO: ABNORMAL
RBC #/AREA URNS AUTO: ABNORMAL /HPF
RBC UR QL AUTO: NEGATIVE
SOURCE (OHS): NORMAL
SOURCE (OHS): NORMAL
SP GR UR STRIP.AUTO: 1.03 (ref 1–1.03)
SQUAMOUS #/AREA URNS LPF: ABNORMAL /HPF
T VAGINALIS VAG QL WET PREP: ABNORMAL
UROBILINOGEN UR STRIP-ACNC: NORMAL
WBC #/AREA URNS AUTO: ABNORMAL /HPF
WBC #/AREA VAG WET PREP: ABNORMAL
YEAST SPEC QL WET PREP: ABNORMAL

## 2023-10-19 PROCEDURE — 99284 EMERGENCY DEPT VISIT MOD MDM: CPT

## 2023-10-19 PROCEDURE — 25000003 PHARM REV CODE 250

## 2023-10-19 PROCEDURE — 87491 CHLMYD TRACH DNA AMP PROBE: CPT

## 2023-10-19 PROCEDURE — 87210 SMEAR WET MOUNT SALINE/INK: CPT

## 2023-10-19 PROCEDURE — 81001 URINALYSIS AUTO W/SCOPE: CPT

## 2023-10-19 PROCEDURE — 81025 URINE PREGNANCY TEST: CPT

## 2023-10-19 RX ORDER — METRONIDAZOLE 500 MG/1
500 TABLET ORAL EVERY 12 HOURS
Qty: 14 TABLET | Refills: 0 | Status: SHIPPED | OUTPATIENT
Start: 2023-10-19 | End: 2023-10-26

## 2023-10-19 RX ORDER — BUTALBITAL, ACETAMINOPHEN AND CAFFEINE 50; 325; 40 MG/1; MG/1; MG/1
1 TABLET ORAL
Status: COMPLETED | OUTPATIENT
Start: 2023-10-19 | End: 2023-10-19

## 2023-10-19 RX ADMIN — BUTALBITAL, ACETAMINOPHEN, AND CAFFEINE 1 TABLET: 325; 50; 40 TABLET ORAL at 03:10

## 2023-10-19 NOTE — ED PROVIDER NOTES
"Encounter Date: 10/19/2023       History     Chief Complaint   Patient presents with    Headache     Pt reports headache x3 days. Pt requesting STD check      Patient is a 23 year old female with no significant PMH that presents to the ED with c/o headache. She states that she has had persistent headache for 3 days that has not relieved with OTC ibuprofen and excedrin. She does get migraines but last on was 6 months ago and usually go away. Headache is dull and mostly frontal. She endorses photophobia but no visual disturbances. She denies any nasal congestion or sinus pain.    She also has concerns for possible STD exposure from her boyfriend. Last intercourse was last week. She mentions of a thicker white discharge and increased in discharge frequency from her normal. She states "it is not foul smell but different than usual." Her LMP was 9/27/23 -10/01/23. She is not on any birth control and denies any barrier contraception use. She denies any dysuria, hematuria or abnormal vaginal bleeding.     The history is provided by the patient. No  was used.     Review of patient's allergies indicates:  No Known Allergies  Past Medical History:   Diagnosis Date    ADHD      No past surgical history on file.  Family History   Problem Relation Age of Onset    No Known Problems Mother     No Known Problems Father      Social History     Tobacco Use    Smoking status: Every Day     Types: Vaping with nicotine    Smokeless tobacco: Never   Substance Use Topics    Alcohol use: Never    Drug use: Never     Review of Systems   Constitutional:  Negative for chills, fatigue and fever.   HENT:  Negative for ear pain, rhinorrhea, sinus pressure, sinus pain, sneezing and sore throat.    Eyes:  Positive for photophobia. Negative for visual disturbance.   Respiratory:  Negative for cough, shortness of breath and wheezing.    Cardiovascular:  Negative for chest pain and palpitations.   Gastrointestinal:  Negative for " abdominal pain, blood in stool, constipation, diarrhea, nausea and vomiting.   Genitourinary:  Positive for vaginal discharge. Negative for dysuria, flank pain, frequency, genital sores, hematuria, menstrual problem, pelvic pain, urgency, vaginal bleeding and vaginal pain.   Musculoskeletal:  Negative for back pain and myalgias.   Neurological:  Positive for headaches. Negative for weakness and numbness.   Psychiatric/Behavioral: Negative.         Physical Exam     Initial Vitals [10/19/23 0312]   BP Pulse Resp Temp SpO2   118/85 71 20 97.2 °F (36.2 °C) 99 %      MAP       --         Physical Exam    Nursing note and vitals reviewed.  Constitutional: She appears well-developed and well-nourished. No distress.   HENT:   Head: Normocephalic and atraumatic.   Right Ear: External ear normal.   Left Ear: External ear normal.   Mouth/Throat: Oropharynx is clear and moist. No oropharyngeal exudate.   Eyes: EOM are normal. Pupils are equal, round, and reactive to light.   Neck:   Normal range of motion.  Cardiovascular:  Normal rate, regular rhythm, normal heart sounds and intact distal pulses.           Pulmonary/Chest: Breath sounds normal. No respiratory distress. She has no wheezes. She has no rales.   Abdominal: Abdomen is soft. Bowel sounds are normal. She exhibits no distension. There is no abdominal tenderness. There is no guarding.   Genitourinary:    Uterus normal.      Pelvic exam was performed with patient supine.   There is no rash or lesion on the right labia. There is no rash or lesion on the left labia. Cervix exhibits discharge (white).    No vaginal erythema or bleeding.   No erythema or bleeding in the vagina.   Musculoskeletal:         General: No tenderness. Normal range of motion.      Cervical back: Normal range of motion.     Neurological: She is alert and oriented to person, place, and time. She has normal strength.   Skin: Skin is warm and dry. Capillary refill takes less than 2 seconds.    Psychiatric: She has a normal mood and affect. Her behavior is normal. Judgment and thought content normal.         ED Course   Procedures  Labs Reviewed   WET PREP, GENITAL - Abnormal; Notable for the following components:       Result Value    WBC, Wet Prep Few (*)     Clue Cells, Wet Prep Few (*)     All other components within normal limits   URINALYSIS, REFLEX TO URINE CULTURE - Abnormal; Notable for the following components:    Protein, UA Trace (*)     Squamous Epithelial Cells, UA Moderate (*)     Mucous, UA Trace (*)     All other components within normal limits   PREGNANCY TEST, URINE RAPID - Normal   CHLAMYDIA/GONORRHOEAE(GC), PCR    Narrative:     The Xpert CT/NG test, performed on the [employer] system is a qualitative in vitro real-time polymerase chain reaction (PCR) test for the automated detected and differentiation for genomic DNA from Chlamydia trachomatis (CT) and/or Neisseria gonorrhoeae (NG).   CHLAMYDIA/GONORRHOEAE(GC), PCR    Narrative:     The Xpert CT/NG test, performed on the [employer] system is a qualitative in vitro real-time polymerase chain reaction (PCR) test for the automated detected and differentiation for genomic DNA from Chlamydia trachomatis (CT) and/or Neisseria gonorrhoeae (NG).          Imaging Results    None          Medications   butalbital-acetaminophen-caffeine -40 mg per tablet 1 tablet (1 tablet Oral Given 10/19/23 0342)     Medical Decision Making  Amount and/or Complexity of Data Reviewed  Labs: ordered. Decision-making details documented in ED Course.    Risk  Prescription drug management.      Additional MDM:   Differential Diagnosis:   Tension headache  Migraines  Bacterial vaginosis  Trichomonas  STD exposure                Attending Attestation:   Physician Attestation Statement for Resident:  As the supervising MD   Physician Attestation Statement: I have personally seen and examined this patient.   I agree with the above history.  -:   As the  supervising MD I agree with the above PE.     As the supervising MD I agree with the above treatment, course, plan, and disposition.    I have reviewed and agree with the residents interpretation of the following: lab data.                     4:18 AM  Patient conset obtained pelvic exam. Nurse Mily Atkinson present as chaperone. White cervical discharge noted. Specimen collected and sent to lab  4:48 AM  Pregnancy test is negative. Wet prep positive for clue cells.   6:19 AM  Gonorrhea and chlamydia test negative. Advised patient to go the [facility] unit for other STD checks. She expressed understanding.        Clinical Impression:   Final diagnoses:  [N76.0, B96.89] Bacterial vaginosis (Primary)  [R51.9] Nonintractable headache, unspecified chronicity pattern, unspecified headache type        ED Disposition Condition    Discharge Stable          ED Prescriptions       Medication Sig Dispense Start Date End Date Auth. Provider    metroNIDAZOLE (FLAGYL) 500 MG tablet Take 1 tablet (500 mg total) by mouth every 12 (twelve) hours. for 7 days 14 tablet 10/19/2023 10/26/2023 Shade Cowart MD          Follow-up Information       Follow up With Specialties Details Why Contact Info    [facility] Emergency Medicine  As needed, If symptoms worsen [address] [facility] [address] [phone]    [facility] Garcia NP [facility], Emergency Medicine In 2 weeks  [address] [facility] [id number] [phone]               Shade Cowart MD  Resident  10/19/23 0621       Ant Leal MD  10/19/23 7635

## 2023-11-15 ENCOUNTER — HOSPITAL ENCOUNTER (EMERGENCY)
Facility: HOSPITAL | Age: 23
Discharge: HOME | End: 2023-11-15
Attending: EMERGENCY MEDICINE
Payer: MEDICAID

## 2023-11-15 VITALS
RESPIRATION RATE: 17 BRPM | OXYGEN SATURATION: 99 % | WEIGHT: 287.5 LBS | BODY MASS INDEX: 42.58 KG/M2 | HEIGHT: 69 IN | HEART RATE: 75 BPM | SYSTOLIC BLOOD PRESSURE: 104 MMHG | DIASTOLIC BLOOD PRESSURE: 64 MMHG | TEMPERATURE: 98 F

## 2023-11-15 DIAGNOSIS — M54.50 ACUTE MIDLINE LOW BACK PAIN WITHOUT SCIATICA: Primary | ICD-10-CM

## 2023-11-15 DIAGNOSIS — Z34.90 EARLY STAGE OF PREGNANCY: ICD-10-CM

## 2023-11-15 LAB
APPEARANCE UR: ABNORMAL
B-HCG SERPL QL: POSITIVE
BACTERIA #/AREA URNS AUTO: ABNORMAL /HPF
BILIRUB UR QL STRIP.AUTO: NEGATIVE
COLOR UR AUTO: ABNORMAL
GLUCOSE UR QL STRIP.AUTO: NORMAL
KETONES UR QL STRIP.AUTO: NEGATIVE
LEUKOCYTE ESTERASE UR QL STRIP.AUTO: 250
MUCOUS THREADS URNS QL MICRO: ABNORMAL /LPF
NITRITE UR QL STRIP.AUTO: NEGATIVE
PH UR STRIP.AUTO: 7 [PH]
PROT UR QL STRIP.AUTO: NEGATIVE
RBC #/AREA URNS AUTO: ABNORMAL /HPF
RBC UR QL AUTO: NEGATIVE
SP GR UR STRIP.AUTO: 1.02 (ref 1–1.03)
SQUAMOUS #/AREA URNS LPF: ABNORMAL /HPF
UROBILINOGEN UR STRIP-ACNC: NORMAL
WBC #/AREA URNS AUTO: ABNORMAL /HPF

## 2023-11-15 PROCEDURE — 81025 URINE PREGNANCY TEST: CPT | Performed by: EMERGENCY MEDICINE

## 2023-11-15 PROCEDURE — 81001 URINALYSIS AUTO W/SCOPE: CPT | Performed by: EMERGENCY MEDICINE

## 2023-11-15 PROCEDURE — 99283 EMERGENCY DEPT VISIT LOW MDM: CPT

## 2023-11-15 NOTE — ED PROVIDER NOTES
"Encounter Date: 11/15/2023       History     Chief Complaint   Patient presents with    Back Pain     Reports lumbar pain x 2 weeks.Denies urinary concerns or trauma. Denies taking OTC meds. Also reports home + pregnancy test 2 days ago. AB 0.     23 F low back pain x 2 weeks.  States her low middle back has been "just sore" for about 2 weeks.  No trauma no falls no numbness or weakness in the lower extremities.  No nausea vomiting no abdominal pain.  States she took a home pregnancy test 2 days ago that was positive.  Have irregular cycles but her last menstrual cycle was  which would put her at most 7 weeks pregnant.  Has not seen an OBGYN for this yet.  She does have a primary care provider.  She asked if I can fill out paperwork for accommodations for her job now that she is pregnant.        Review of patient's allergies indicates:  No Known Allergies  Past Medical History:   Diagnosis Date    ADHD      No past surgical history on file.  Family History   Problem Relation Age of Onset    No Known Problems Mother     No Known Problems Father      Social History     Tobacco Use    Smoking status: Every Day     Types: Vaping with nicotine    Smokeless tobacco: Never   Substance Use Topics    Alcohol use: Never    Drug use: Never     Review of Systems   Constitutional:  Negative for fever.   Respiratory:  Negative for cough, chest tightness and shortness of breath.    Cardiovascular:  Negative for chest pain.   Gastrointestinal:  Negative for abdominal pain, nausea and vomiting.   Musculoskeletal:  Positive for back pain. Negative for neck pain.   Neurological:  Negative for weakness and numbness.   All other systems reviewed and are negative.      Physical Exam     Initial Vitals [11/15/23 0446]   BP Pulse Resp Temp SpO2   104/64 75 17 97.5 °F (36.4 °C) 99 %      MAP       --         Physical Exam    Nursing note and vitals reviewed.  Constitutional: She appears well-developed and well-nourished. " No distress.   HENT:   Head: Normocephalic and atraumatic.   Eyes: Conjunctivae are normal.   Cardiovascular:  Normal rate and intact distal pulses.           Pulmonary/Chest: No respiratory distress. She has no rhonchi.   Abdominal: Abdomen is soft. Bowel sounds are normal. There is no abdominal tenderness. There is no rebound and no guarding.   Musculoskeletal:         General: No edema.     Neurological: She is alert and oriented to person, place, and time. She has normal strength.   Nl hip flexion 5/5 knee, ankle, great toes. Nl light touch sensation to distal LEs     Skin: Skin is warm and dry.   Psychiatric: She has a normal mood and affect.         ED Course   Procedures  Labs Reviewed   URINALYSIS, REFLEX TO URINE CULTURE - Abnormal; Notable for the following components:       Result Value    Appearance, UA Turbid (*)     Leukocyte Esterase,  (*)     WBC, UA 6-10 (*)     Squamous Epithelial Cells, UA Few (*)     Mucous, UA Trace (*)     All other components within normal limits   PREGNANCY TEST, URINE RAPID - Abnormal; Notable for the following components:    Beta hCG Qualitative, Urine Positive (*)     All other components within normal limits          Imaging Results    None          Medications - No data to display  Medical Decision Making  23-year-old female checked him for lower back pain.  Positive home pregnancy test 2 days ago.  Has not seen a PCP for this yet.  No dysuria no abdominal pain.  No trauma or injuries.  Normal strength in lower extremities and normal light touch sensation.  Actually watched her ambulate from triage to the emergency department room and she would no gait instability no difficulty in no apparent pain.  I discussed being that she would a pregnancy test she should only take acetaminophen as needed for pain.  She is no tenderness on palpation of the thoracic or lumbar spine on exam.  She asked about fill out paperwork for accommodations for her job.  I explained to her  that that paperwork needs to be filled out by her OBGYN or primary care provider.  I did explain to her that there are no specific limitations at this time anything she was doing before the pregnancy she can continue doing at this point.  Do not started any new exercise programs or strenuous exercises.  She expressed understanding               ED Course as of 11/15/23 0537   Wed Nov 15, 2023   0534 Urinalysis with no bacteria.  Few squames few white cells leuk esterase but contaminated sample.  UPT is positive.  Follow up with OBGYN and PCP acetaminophen as needed for pain.  I explained to her that she could use mild heat to the low back only do not put any heat on her side or abdomen [LF]      ED Course User Index  [LF] Davi Farnsworth MD                    Clinical Impression:   Final diagnoses:  [M54.50] Acute midline low back pain without sciatica (Primary)  [Z34.90] Early stage of pregnancy        ED Disposition Condition    Discharge Stable          ED Prescriptions    None       Follow-up Information       Follow up With Specialties Details Why Contact Info    [facility] NP [facility], Emergency Medicine   [address] [address]  [phone]      Deidre Carroll NP Nurse Practitioner   [address] [address]  [phone]               Davi Farnsworth MD  11/15/23 0272

## 2023-11-19 ENCOUNTER — HOSPITAL ENCOUNTER (EMERGENCY)
Facility: HOSPITAL | Age: 23
Discharge: HOME | End: 2023-11-20
Attending: FAMILY MEDICINE
Payer: MEDICAID

## 2023-11-19 DIAGNOSIS — O21.9 NAUSEA/VOMITING IN PREGNANCY: Primary | ICD-10-CM

## 2023-11-19 LAB
ALBUMIN SERPL-MCNC: 3.6 G/DL (ref 3.5–5)
ALBUMIN/GLOB SERPL: 0.9 RATIO (ref 1.1–2)
ALP SERPL-CCNC: 54 UNIT/L (ref 40–150)
ALT SERPL-CCNC: 8 UNIT/L (ref 0–55)
AST SERPL-CCNC: 11 UNIT/L (ref 5–34)
B-HCG FREE SERPL-ACNC: ABNORMAL MIU/ML
BASOPHILS # BLD AUTO: 0.02 X10(3)/MCL
BASOPHILS NFR BLD AUTO: 0.4 %
BILIRUB SERPL-MCNC: 0.3 MG/DL
BUN SERPL-MCNC: 6.4 MG/DL (ref 7–18.7)
CALCIUM SERPL-MCNC: 9.6 MG/DL (ref 8.4–10.2)
CHLORIDE SERPL-SCNC: 106 MMOL/L (ref 98–107)
CO2 SERPL-SCNC: 23 MMOL/L (ref 22–29)
CREAT SERPL-MCNC: 0.76 MG/DL (ref 0.55–1.02)
EOSINOPHIL # BLD AUTO: 0.03 X10(3)/MCL (ref 0–0.9)
EOSINOPHIL NFR BLD AUTO: 0.5 %
ERYTHROCYTE [DISTWIDTH] IN BLOOD BY AUTOMATED COUNT: 13.5 % (ref 11.5–17)
GFR SERPLBLD CREATININE-BSD FMLA CKD-EPI: >60 MLS/MIN/1.73/M2
GLOBULIN SER-MCNC: 3.9 GM/DL (ref 2.4–3.5)
GLUCOSE SERPL-MCNC: 80 MG/DL (ref 74–100)
HCT VFR BLD AUTO: 35.9 % (ref 37–47)
HGB BLD-MCNC: 11.7 G/DL (ref 12–16)
HOLD SPECIMEN: NORMAL
IMM GRANULOCYTES # BLD AUTO: 0.03 X10(3)/MCL (ref 0–0.04)
IMM GRANULOCYTES NFR BLD AUTO: 0.5 %
LYMPHOCYTES # BLD AUTO: 1.51 X10(3)/MCL (ref 0.6–4.6)
LYMPHOCYTES NFR BLD AUTO: 27.6 %
MCH RBC QN AUTO: 28.9 PG (ref 27–31)
MCHC RBC AUTO-ENTMCNC: 32.6 G/DL (ref 33–36)
MCV RBC AUTO: 88.6 FL (ref 80–94)
MONOCYTES # BLD AUTO: 0.4 X10(3)/MCL (ref 0.1–1.3)
MONOCYTES NFR BLD AUTO: 7.3 %
NEUTROPHILS # BLD AUTO: 3.48 X10(3)/MCL (ref 2.1–9.2)
NEUTROPHILS NFR BLD AUTO: 63.7 %
NRBC BLD AUTO-RTO: 0 %
PLATELET # BLD AUTO: 295 X10(3)/MCL (ref 130–400)
PMV BLD AUTO: 9.5 FL (ref 7.4–10.4)
POTASSIUM SERPL-SCNC: 4 MMOL/L (ref 3.5–5.1)
PROT SERPL-MCNC: 7.5 GM/DL (ref 6.4–8.3)
RBC # BLD AUTO: 4.05 X10(6)/MCL (ref 4.2–5.4)
SODIUM SERPL-SCNC: 138 MMOL/L (ref 136–145)
WBC # SPEC AUTO: 5.47 X10(3)/MCL (ref 4.5–11.5)

## 2023-11-19 PROCEDURE — 96374 THER/PROPH/DIAG INJ IV PUSH: CPT

## 2023-11-19 PROCEDURE — 85025 COMPLETE CBC W/AUTO DIFF WBC: CPT | Performed by: FAMILY MEDICINE

## 2023-11-19 PROCEDURE — 80053 COMPREHEN METABOLIC PANEL: CPT | Performed by: FAMILY MEDICINE

## 2023-11-19 PROCEDURE — 99284 EMERGENCY DEPT VISIT MOD MDM: CPT | Mod: 25

## 2023-11-19 PROCEDURE — 84702 CHORIONIC GONADOTROPIN TEST: CPT | Performed by: FAMILY MEDICINE

## 2023-11-19 PROCEDURE — 96361 HYDRATE IV INFUSION ADD-ON: CPT

## 2023-11-19 PROCEDURE — 81001 URINALYSIS AUTO W/SCOPE: CPT | Performed by: FAMILY MEDICINE

## 2023-11-19 PROCEDURE — 63600175 PHARM REV CODE 636 W HCPCS: Performed by: FAMILY MEDICINE

## 2023-11-19 RX ORDER — METOCLOPRAMIDE HYDROCHLORIDE 5 MG/ML
10 INJECTION INTRAMUSCULAR; INTRAVENOUS
Status: COMPLETED | OUTPATIENT
Start: 2023-11-19 | End: 2023-11-19

## 2023-11-19 RX ADMIN — METOCLOPRAMIDE HYDROCHLORIDE 10 MG: 5 INJECTION INTRAMUSCULAR; INTRAVENOUS at 09:11

## 2023-11-19 RX ADMIN — SODIUM CHLORIDE, POTASSIUM CHLORIDE, SODIUM LACTATE AND CALCIUM CHLORIDE 1000 ML: 600; 310; 30; 20 INJECTION, SOLUTION INTRAVENOUS at 09:11

## 2023-11-19 NOTE — Clinical Note
"Bianca Ly" Danni was seen and treated in our emergency department on 11/19/2023.  She may return to work on 11/22/2023.       If you have any questions or concerns, please don't hesitate to call.       RN    "

## 2023-11-20 VITALS
DIASTOLIC BLOOD PRESSURE: 79 MMHG | HEART RATE: 68 BPM | TEMPERATURE: 99 F | BODY MASS INDEX: 42.74 KG/M2 | RESPIRATION RATE: 18 BRPM | WEIGHT: 288.56 LBS | HEIGHT: 69 IN | SYSTOLIC BLOOD PRESSURE: 118 MMHG | OXYGEN SATURATION: 100 %

## 2023-11-20 LAB
APPEARANCE UR: ABNORMAL
BACTERIA #/AREA URNS AUTO: ABNORMAL /HPF
BILIRUB UR QL STRIP.AUTO: NEGATIVE
COLOR UR AUTO: YELLOW
GLUCOSE UR QL STRIP.AUTO: NORMAL
HYALINE CASTS #/AREA URNS LPF: ABNORMAL /LPF
KETONES UR QL STRIP.AUTO: NEGATIVE
LEUKOCYTE ESTERASE UR QL STRIP.AUTO: 75
MUCOUS THREADS URNS QL MICRO: ABNORMAL /LPF
NITRITE UR QL STRIP.AUTO: NEGATIVE
PH UR STRIP.AUTO: 6 [PH]
PROT UR QL STRIP.AUTO: ABNORMAL
RBC #/AREA URNS AUTO: ABNORMAL /HPF
RBC UR QL AUTO: NEGATIVE
SP GR UR STRIP.AUTO: 1.02 (ref 1–1.03)
SQUAMOUS #/AREA URNS LPF: ABNORMAL /HPF
UROBILINOGEN UR STRIP-ACNC: NORMAL
WBC #/AREA URNS AUTO: ABNORMAL /HPF

## 2023-11-20 NOTE — ED PROVIDER NOTES
Encounter Date: 2023       History     Chief Complaint   Patient presents with    Vomiting    Nausea     Nausea and vomiting several times throughout the day x 1 week. Recently discovered pregnancy; OB appointment in December. Requesting nausea meds.     Patient is a 23-year-old female presents emergency room for evaluation with complaints of nausea and vomiting.  Patient reports she recently found out she was pregnant.  She was , currently to follow outpatient with Dr. Glover on 2023.  Patient has been dealing with nausea and vomiting during this pregnancy.  Denies abdominal pain.  Denies vaginal bleeding.  Denies dysuria or hematuria.    The history is provided by the patient.     Review of patient's allergies indicates:  No Known Allergies  Past Medical History:   Diagnosis Date    ADHD      History reviewed. No pertinent surgical history.  Family History   Problem Relation Age of Onset    No Known Problems Mother     No Known Problems Father      Social History     Tobacco Use    Smoking status: Every Day     Types: Vaping with nicotine    Smokeless tobacco: Never   Substance Use Topics    Alcohol use: Never    Drug use: Never     Review of Systems   Constitutional:  Negative for chills, fatigue and fever.   HENT:  Negative for ear pain, rhinorrhea and sore throat.    Eyes:  Negative for photophobia and pain.   Respiratory:  Negative for cough, shortness of breath and wheezing.    Cardiovascular:  Negative for chest pain.   Gastrointestinal:  Positive for nausea and vomiting. Negative for abdominal pain and diarrhea.   Genitourinary:  Negative for dysuria.   Neurological:  Negative for dizziness, weakness and headaches.   All other systems reviewed and are negative.      Physical Exam     Initial Vitals [23]   BP Pulse Resp Temp SpO2   120/82 84 16 98.7 °F (37.1 °C) 100 %      MAP       --         Physical Exam    Nursing note and vitals reviewed.  Constitutional: She appears  well-developed and well-nourished. No distress.   HENT:   Head: Normocephalic and atraumatic.   Mouth/Throat: Oropharynx is clear and moist.   Eyes: Conjunctivae and EOM are normal. Pupils are equal, round, and reactive to light.   Neck: Neck supple.   Normal range of motion.  Cardiovascular:  Normal rate, regular rhythm, normal heart sounds and intact distal pulses.           Pulmonary/Chest: Breath sounds normal. No respiratory distress. She has no wheezes. She has no rhonchi. She has no rales.   Abdominal: Abdomen is soft. Bowel sounds are normal. She exhibits no distension. There is no abdominal tenderness. There is no rebound and no guarding.   Musculoskeletal:         General: Normal range of motion.      Cervical back: Normal range of motion and neck supple.     Neurological: She is alert and oriented to person, place, and time.   Skin: Skin is warm and dry. Capillary refill takes less than 2 seconds. No erythema.   Psychiatric: She has a normal mood and affect. Her behavior is normal. Judgment and thought content normal.         ED Course   Procedures  Labs Reviewed   COMPREHENSIVE METABOLIC PANEL - Abnormal; Notable for the following components:       Result Value    Blood Urea Nitrogen 6.4 (*)     Globulin 3.9 (*)     Albumin/Globulin Ratio 0.9 (*)     All other components within normal limits   URINALYSIS, REFLEX TO URINE CULTURE - Abnormal; Notable for the following components:    Appearance, UA Turbid (*)     Protein, UA Trace (*)     Leukocyte Esterase, UA 75 (*)     WBC, UA 6-10 (*)     Squamous Epithelial Cells, UA Moderate (*)     Mucous, UA Trace (*)     Hyaline Casts, UA 0-2 (*)     All other components within normal limits   CBC WITH DIFFERENTIAL - Abnormal; Notable for the following components:    RBC 4.05 (*)     Hgb 11.7 (*)     Hct 35.9 (*)     MCHC 32.6 (*)     All other components within normal limits   HCG, QUANTITATIVE - Abnormal; Notable for the following components:    Beta Human  Chorionic Gonadotropin Quantitative 69,869.52 (*)     All other components within normal limits   CBC W/ AUTO DIFFERENTIAL    Narrative:     The following orders were created for panel order CBC Auto Differential.  Procedure                               Abnormality         Status                     ---------                               -----------         ------                     CBC with Differential[[id number]]       Abnormal            Final result                 Please view results for these tests on the individual orders.   EXTRA TUBES    Narrative:     The following orders were created for panel order EXTRA TUBES.  Procedure                               Abnormality         Status                     ---------                               -----------         ------                     Light Blue Top Hold[[id number]]                             Final result               Light Green Top Hold[[id number]]                            Final result               Gold Top Hold[[id number]]                                   Final result                 Please view results for these tests on the individual orders.   LIGHT BLUE TOP HOLD   LIGHT GREEN TOP HOLD   GOLD TOP HOLD          Imaging Results    None          Medications   metoclopramide HCl injection 10 mg (10 mg Intravenous Given 23)   lactated ringers bolus 1,000 mL (0 mLs Intravenous Stopped 23)     Medical Decision Making  Patient is a 23-year-old female presents emergency room with complaints of nausea vomiting without bleeding, currently  in first-trimester pregnancy.  Has not followed up for additional OB appointment yet.  Will obtain laboratory evaluation including CBC CMP and a quantitative beta hCG.  Will also obtain a urinalysis.  Will continue to monitor.    Differential diagnosis:  Nausea vomiting pregnancy, ectopic pregnancy, acute cystitis in pregnancy, molar pregnancy    Amount and/or Complexity of Data  Reviewed  Labs: ordered.    Risk  Prescription drug management.               ED Course as of 11/20/23 0039   Sun Nov 19, 2023   2324 Bedside ultrasound performed, noted to have fetal heart tones at 161 beats per minute.  Fetal pole identified.  Pregnancy is intrauterine. [MW]   Mon Nov 20, 2023   0038 Urinalysis took a significant amount of time to process, now that resulted, shows no bacteria bacteria; moderate squamous epithelial cells consistent with a contaminated catch and no evidence for an acute cystitis. [MW]      ED Course User Index  [MW] Nakul Sosa MD                        Clinical Impression:  Final diagnoses:  [O21.9] Nausea/vomiting in pregnancy (Primary)          ED Disposition Condition    Discharge Stable          ED Prescriptions    None       Follow-up Information       Follow up With Specialties Details Why Contact Info    [facility] Emergency Medicine  As needed, If symptoms worsen [address] [facility] [address] [phone]    [facility] NP [facility], Emergency Medicine   [address] [place] [address] [phone]               Nakul Sosa MD  11/19/23 9137       Nakul Sosa MD  11/20/23 0039

## 2023-12-05 ENCOUNTER — LAB VISIT (OUTPATIENT)
Dept: LAB | Facility: HOSPITAL | Age: 23
End: 2023-12-05
Attending: OBSTETRICS & GYNECOLOGY
Payer: MEDICAID

## 2023-12-05 DIAGNOSIS — N91.2 AMENORRHEA: ICD-10-CM

## 2023-12-05 LAB
B-HCG FREE SERPL-ACNC: 506.06 MIU/ML
BASOPHILS # BLD AUTO: 0.01 X10(3)/MCL
BASOPHILS NFR BLD AUTO: 0.1 %
EOSINOPHIL # BLD AUTO: 0.04 X10(3)/MCL (ref 0–0.9)
EOSINOPHIL NFR BLD AUTO: 0.5 %
ERYTHROCYTE [DISTWIDTH] IN BLOOD BY AUTOMATED COUNT: 13.9 % (ref 11.5–17)
HCT VFR BLD AUTO: 34.6 % (ref 37–47)
HGB BLD-MCNC: 10.9 G/DL (ref 12–16)
IMM GRANULOCYTES # BLD AUTO: 0.02 X10(3)/MCL (ref 0–0.04)
IMM GRANULOCYTES NFR BLD AUTO: 0.2 %
LYMPHOCYTES # BLD AUTO: 2.46 X10(3)/MCL (ref 0.6–4.6)
LYMPHOCYTES NFR BLD AUTO: 28.1 %
MCH RBC QN AUTO: 29.1 PG (ref 27–31)
MCHC RBC AUTO-ENTMCNC: 31.5 G/DL (ref 33–36)
MCV RBC AUTO: 92.5 FL (ref 80–94)
MONOCYTES # BLD AUTO: 0.51 X10(3)/MCL (ref 0.1–1.3)
MONOCYTES NFR BLD AUTO: 5.8 %
NEUTROPHILS # BLD AUTO: 5.73 X10(3)/MCL (ref 2.1–9.2)
NEUTROPHILS NFR BLD AUTO: 65.3 %
PLATELET # BLD AUTO: 308 X10(3)/MCL (ref 130–400)
PMV BLD AUTO: 9.7 FL (ref 7.4–10.4)
RBC # BLD AUTO: 3.74 X10(6)/MCL (ref 4.2–5.4)
WBC # SPEC AUTO: 8.77 X10(3)/MCL (ref 4.5–11.5)

## 2023-12-05 PROCEDURE — 85025 COMPLETE CBC W/AUTO DIFF WBC: CPT

## 2023-12-05 PROCEDURE — 36415 COLL VENOUS BLD VENIPUNCTURE: CPT

## 2023-12-05 PROCEDURE — 84702 CHORIONIC GONADOTROPIN TEST: CPT

## 2023-12-07 ENCOUNTER — LAB VISIT (OUTPATIENT)
Dept: LAB | Facility: HOSPITAL | Age: 23
End: 2023-12-07
Attending: OBSTETRICS & GYNECOLOGY
Payer: MEDICAID

## 2023-12-07 DIAGNOSIS — N91.2 AMENORRHEA: ICD-10-CM

## 2023-12-07 LAB — B-HCG FREE SERPL-ACNC: 280.42 MIU/ML

## 2023-12-07 PROCEDURE — 36415 COLL VENOUS BLD VENIPUNCTURE: CPT

## 2023-12-07 PROCEDURE — 84702 CHORIONIC GONADOTROPIN TEST: CPT

## 2024-01-24 ENCOUNTER — LAB VISIT (OUTPATIENT)
Dept: LAB | Facility: HOSPITAL | Age: 24
End: 2024-01-24
Attending: OBSTETRICS & GYNECOLOGY
Payer: MEDICAID

## 2024-01-24 DIAGNOSIS — Z20.2 POSSIBLE EXPOSURE TO STD: ICD-10-CM

## 2024-01-24 LAB
HAV IGM SERPL QL IA: NONREACTIVE
HBV CORE IGM SERPL QL IA: NONREACTIVE
HBV SURFACE AG SERPL QL IA: NONREACTIVE
HCV AB SERPL QL IA: NONREACTIVE
HIV 1+2 AB+HIV1 P24 AG SERPL QL IA: NONREACTIVE

## 2024-01-24 PROCEDURE — 36415 COLL VENOUS BLD VENIPUNCTURE: CPT

## 2024-01-24 PROCEDURE — 86780 TREPONEMA PALLIDUM: CPT

## 2024-01-24 PROCEDURE — 80074 ACUTE HEPATITIS PANEL: CPT

## 2024-01-24 PROCEDURE — 87389 HIV-1 AG W/HIV-1&-2 AB AG IA: CPT

## 2024-01-28 LAB — T PALLIDUM AB SER QL: NON REACTIVE

## 2024-01-29 LAB — PATH REV: NORMAL

## 2024-08-14 ENCOUNTER — HOSPITAL ENCOUNTER (EMERGENCY)
Facility: HOSPITAL | Age: 24
Discharge: LEFT BEFORE DISCHARGE | End: 2024-08-14

## 2024-08-14 VITALS
TEMPERATURE: 103 F | HEIGHT: 69 IN | SYSTOLIC BLOOD PRESSURE: 145 MMHG | DIASTOLIC BLOOD PRESSURE: 77 MMHG | RESPIRATION RATE: 18 BRPM | BODY MASS INDEX: 39.99 KG/M2 | OXYGEN SATURATION: 100 % | HEART RATE: 112 BPM | WEIGHT: 270 LBS

## 2024-08-14 LAB
FLUAV AG UPPER RESP QL IA.RAPID: NOT DETECTED
FLUBV AG UPPER RESP QL IA.RAPID: NOT DETECTED
RSV A 5' UTR RNA NPH QL NAA+PROBE: NOT DETECTED
SARS-COV-2 RNA RESP QL NAA+PROBE: DETECTED

## 2024-08-14 PROCEDURE — 25000003 PHARM REV CODE 250: Performed by: EMERGENCY MEDICINE

## 2024-08-14 PROCEDURE — 0241U COVID/RSV/FLU A&B PCR: CPT | Performed by: EMERGENCY MEDICINE

## 2024-08-14 PROCEDURE — 99900041 HC LEFT WITHOUT BEING SEEN- EMERGENCY

## 2024-08-14 RX ORDER — ACETAMINOPHEN 500 MG
1000 TABLET ORAL
Status: COMPLETED | OUTPATIENT
Start: 2024-08-14 | End: 2024-08-14

## 2024-08-14 RX ADMIN — ACETAMINOPHEN 1000 MG: 500 TABLET ORAL at 05:08

## 2025-01-01 ENCOUNTER — HOSPITAL ENCOUNTER (EMERGENCY)
Facility: HOSPITAL | Age: 25
Discharge: HOME | End: 2025-01-01
Attending: EMERGENCY MEDICINE
Payer: COMMERCIAL

## 2025-01-01 VITALS
DIASTOLIC BLOOD PRESSURE: 74 MMHG | OXYGEN SATURATION: 99 % | HEIGHT: 69 IN | SYSTOLIC BLOOD PRESSURE: 103 MMHG | BODY MASS INDEX: 40.77 KG/M2 | RESPIRATION RATE: 18 BRPM | WEIGHT: 275.25 LBS | TEMPERATURE: 99 F | HEART RATE: 88 BPM

## 2025-01-01 DIAGNOSIS — R05.1 ACUTE COUGH: ICD-10-CM

## 2025-01-01 DIAGNOSIS — J10.1 INFLUENZA A: Primary | ICD-10-CM

## 2025-01-01 LAB
FLUAV AG UPPER RESP QL IA.RAPID: DETECTED
FLUBV AG UPPER RESP QL IA.RAPID: NOT DETECTED
RSV A 5' UTR RNA NPH QL NAA+PROBE: NOT DETECTED
SARS-COV-2 RNA RESP QL NAA+PROBE: NOT DETECTED
STREP A PCR (OHS): NOT DETECTED

## 2025-01-01 PROCEDURE — 87651 STREP A DNA AMP PROBE: CPT | Performed by: PHYSICIAN ASSISTANT

## 2025-01-01 PROCEDURE — 99283 EMERGENCY DEPT VISIT LOW MDM: CPT

## 2025-01-01 PROCEDURE — 0241U COVID/RSV/FLU A&B PCR: CPT | Performed by: PHYSICIAN ASSISTANT

## 2025-01-01 RX ORDER — PROMETHAZINE HYDROCHLORIDE AND DEXTROMETHORPHAN HYDROBROMIDE 6.25; 15 MG/5ML; MG/5ML
5 SYRUP ORAL EVERY 6 HOURS PRN
Qty: 118 ML | Refills: 0 | Status: SHIPPED | OUTPATIENT
Start: 2025-01-01

## 2025-01-01 RX ORDER — CETIRIZINE HYDROCHLORIDE 10 MG/1
10 TABLET ORAL DAILY
Qty: 14 TABLET | Refills: 0 | Status: SHIPPED | OUTPATIENT
Start: 2025-01-01 | End: 2025-01-15

## 2025-01-01 NOTE — ED PROVIDER NOTES
Encounter Date: 1/1/2025       History     Chief Complaint   Patient presents with    Generalized Body Aches     For the past 3 days    Cough     For there past 3 days    Sore Throat     For the past 3 days    Nasal Congestion     For the past 3 days     24-year-old female presents to the emergency department with complaints of generalized body aches, cough, sore throat and nasal congestion x 3 days.     The history is provided by the patient. No  was used.     Review of patient's allergies indicates:  No Known Allergies  Past Medical History:   Diagnosis Date    ADHD      No past surgical history on file.  Family History   Problem Relation Name Age of Onset    No Known Problems Mother      No Known Problems Father       Social History     Tobacco Use    Smoking status: Every Day     Types: Vaping with nicotine    Smokeless tobacco: Never   Substance Use Topics    Alcohol use: Never    Drug use: Never     Review of Systems   HENT:  Positive for congestion and sore throat.    Respiratory:  Positive for cough.    Musculoskeletal:  Positive for myalgias.       Physical Exam     Initial Vitals [01/01/25 1409]   BP Pulse Resp Temp SpO2   103/74 88 18 98.6 °F (37 °C) 99 %      MAP       --         Physical Exam    Nursing note and vitals reviewed.  Constitutional: She appears well-developed and well-nourished.   Cardiovascular:  Normal rate, regular rhythm, normal heart sounds and intact distal pulses.           Pulmonary/Chest: Breath sounds normal. No respiratory distress. She has no wheezes. She has no rhonchi. She has no rales. She exhibits no tenderness.   Abdominal: Abdomen is soft. Bowel sounds are normal.   Musculoskeletal:         General: Normal range of motion.     Neurological: She is alert.   Skin: Capillary refill takes less than 2 seconds.         ED Course   Procedures  Labs Reviewed   COVID/RSV/FLU A&B PCR - Abnormal       Result Value    Influenza A PCR Detected (*)     Influenza B  PCR Not Detected      Respiratory Syncytial Virus PCR Not Detected      SARS-CoV-2 PCR Not Detected      Narrative:     The Xpert Xpress SARS-CoV-2/FLU/RSV plus is a rapid, multiplexed real-time PCR test intended for the simultaneous qualitative detection and differentiation of SARS-CoV-2, Influenza A, Influenza B, and respiratory syncytial virus (RSV) viral RNA in either nasopharyngeal swab or nasal swab specimens.         STREP GROUP A BY PCR - Normal    STREP A PCR ([facility]) Not Detected      Narrative:     The Xpert Xpress Strep A test is a rapid, qualitative in vitro diagnostic test for the detection of Streptococcus pyogenes (Group A ß-hemolytic Streptococcus, Strep A) in throat swab specimens from patients with signs and symptoms of pharyngitis.            Imaging Results    None          Medications - No data to display  Medical Decision Making  24-year-old female presents to the emergency department with complaints of generalized body aches, cough, sore throat and nasal congestion x 3 days.     DDx: COVID, influenza, RSV, strep pharyngitis    Influenza A.  Prescribed promethazine DM and cetirizine.  Three days of symptoms therefore will not prescribed Tamiflu.  Vitals stable.      Amount and/or Complexity of Data Reviewed  Labs:  Decision-making details documented in ED Course.    Risk  OTC drugs.  Prescription drug management.               ED Course as of 01/01/25 1546   Wed Jan 01, 2025   1532 Influenza A, Molecular(!): Detected [ER]   1542 The patient is resting comfortably and in no acute distress.  I personally discussed her test results and treatment plan.  Gave strict ED precautions, discussed specific conditions for return to the emergency department and importance of follow up with her primary care provider.  Patient voices understanding and agrees to the plan discussed. All patients' questions have been answered at this time.   She has remained hemodynamically stable throughout entire stay in ED and  is stable for discharge home.  [ER]      ED Course User Index  [ER] Kristie Baldwin PA                           Clinical Impression:  Final diagnoses:  [J10.1] Influenza A (Primary)  [R05.1] Acute cough          ED Disposition Condition    Discharge Stable          ED Prescriptions       Medication Sig Dispense Start Date End Date Auth. Provider    promethazine-dextromethorphan (PROMETHAZINE-DM) 6.25-15 mg/5 mL Syrp Take 5 mLs by mouth every 6 (six) hours as needed (cough). 118 mL 1/1/2025 -- Kristie Baldwin PA    cetirizine (ZYRTEC) 10 MG tablet Take 1 tablet (10 mg total) by mouth once daily. for 14 days 14 tablet 1/1/2025 1/15/2025 Kristie Baldwin PA          Follow-up Information       Follow up With Specialties Details Why Contact Info [facility] Emergency Medicine  As needed, If symptoms worsen [address] [facility] [address] [phone]    Perlita Garcia NP Urgent Care, Emergency Medicine, Family Medicine Schedule an appointment as soon as possible for a visit in 2 days  [address] [facility] [address] [phone]               Kristie Baldwin PA  01/01/25 1548

## 2025-08-03 ENCOUNTER — OFFICE VISIT (OUTPATIENT)
Dept: URGENT CARE | Facility: CLINIC | Age: 25
End: 2025-08-03
Payer: COMMERCIAL

## 2025-08-03 ENCOUNTER — TELEPHONE (OUTPATIENT)
Dept: URGENT CARE | Facility: CLINIC | Age: 25
End: 2025-08-03

## 2025-08-03 ENCOUNTER — RESULTS FOLLOW-UP (OUTPATIENT)
Dept: URGENT CARE | Facility: CLINIC | Age: 25
End: 2025-08-03

## 2025-08-03 VITALS
TEMPERATURE: 99 F | BODY MASS INDEX: 42.21 KG/M2 | SYSTOLIC BLOOD PRESSURE: 107 MMHG | RESPIRATION RATE: 16 BRPM | HEART RATE: 63 BPM | OXYGEN SATURATION: 100 % | DIASTOLIC BLOOD PRESSURE: 65 MMHG | HEIGHT: 69 IN | WEIGHT: 285 LBS

## 2025-08-03 DIAGNOSIS — B37.31 YEAST VAGINITIS: Primary | ICD-10-CM

## 2025-08-03 DIAGNOSIS — Z11.3 SCREENING FOR STD (SEXUALLY TRANSMITTED DISEASE): ICD-10-CM

## 2025-08-03 DIAGNOSIS — N89.8 VAGINAL DISCHARGE: Primary | ICD-10-CM

## 2025-08-03 LAB
B-HCG UR QL: NEGATIVE
CLUE CELLS VAG QL WET PREP: ABNORMAL
CTP QC/QA: YES
T VAGINALIS VAG QL WET PREP: ABNORMAL
WBC #/AREA VAG WET PREP: ABNORMAL
YEAST SPEC QL WET PREP: ABNORMAL

## 2025-08-03 PROCEDURE — 99214 OFFICE O/P EST MOD 30 MIN: CPT | Mod: PBBFAC | Performed by: NURSE PRACTITIONER

## 2025-08-03 PROCEDURE — 87210 SMEAR WET MOUNT SALINE/INK: CPT | Performed by: NURSE PRACTITIONER

## 2025-08-03 PROCEDURE — 87661 TRICHOMONAS VAGINALIS AMPLIF: CPT | Performed by: NURSE PRACTITIONER

## 2025-08-03 PROCEDURE — 81025 URINE PREGNANCY TEST: CPT | Mod: PBBFAC | Performed by: NURSE PRACTITIONER

## 2025-08-03 RX ORDER — FLUCONAZOLE 150 MG/1
150 TABLET ORAL ONCE
Qty: 2 TABLET | Refills: 0 | Status: SHIPPED | OUTPATIENT
Start: 2025-08-03 | End: 2025-08-03

## 2025-08-03 NOTE — TELEPHONE ENCOUNTER
----- Message from JAVID Bernal sent at 8/3/2025  5:28 PM CDT -----  Notify patient that her vaginal swab is consistent with yeast.  Your vaginal swab is consistent with a vaginal yeast infection. The medication to treat this condition has been prescribed for you and sent to your pharmacy. (fluconazole). Practice good hygiene   practice. Wiping from front to back after urinating. Stop all bath bombs, scented washes, scented feminine products, scented toilet paper, douching, and tub baths.   Shower only.  Clean and wipe front to back only.  Plain dove or dial soap, or summer's jim wash if desired.   ----- Message -----  From: Lu Hirsch LPN  Sent: 8/3/2025   3:05 PM CDT  To: [facility]gent [facility] Providers

## 2025-08-03 NOTE — PROGRESS NOTES
"Subjective:      Patient ID: Bianca Razo is a 25 y.o. female.    Vitals:  height is 5' 9" (1.753 m) and weight is 129.3 kg (285 lb). Her temperature is 98.8 °F (37.1 °C). Her blood pressure is 107/65 and her pulse is 63. Her respiration is 16 and oxygen saturation is 100%.     Chief Complaint: Vaginal Discharge (Pt c/o vaginal discharge , vaginal discomfort and burning after urination x 2 days /Pt requesting STD testing )    Vaginal Discharge  The patient's primary symptoms include vaginal discharge.    as stated in chief complaint.  Patient reports unprotected sex with 1 male partner.  Is requesting STD testing today.  Patient denies fever, stomach pain, nausea or vomiting, vaginal rash    Genitourinary:  Positive for vaginal discharge.      Objective:     Physical Exam   Constitutional: She appears well-developed.  Non-toxic appearance. She does not appear ill. No distress.   HENT:   Head: Atraumatic.   Nose: No purulent discharge. Right sinus exhibits no maxillary sinus tenderness and no frontal sinus tenderness. Left sinus exhibits no maxillary sinus tenderness and no frontal sinus tenderness.   Mouth/Throat: Uvula is midline.   Eyes: Right eye exhibits no discharge. Left eye exhibits no discharge. Extraocular movement intact   Neck: Neck supple. No neck rigidity present.   Cardiovascular: Regular rhythm.   Pulmonary/Chest: Effort normal and breath sounds normal. No respiratory distress. She has no wheezes. She has no rhonchi. She has no rales.   Genitourinary:         Comments: Deferred     Lymphadenopathy:     She has no cervical adenopathy.   Neurological: She is alert.   Skin: Skin is warm, dry and not diaphoretic.   Psychiatric: Her behavior is normal. Mood, judgment and thought content normal.   Nursing note and vitals reviewed.      Assessment:     1. Vaginal discharge    2. Screening for STD (sexually transmitted disease)      Results for orders placed or performed in visit on 08/03/25   POCT urine " pregnancy    Collection Time: 08/03/25  3:05 PM   Result Value Ref Range    POC Preg Test, Ur Negative Negative     Acceptable Yes          Plan:   ER precautions given and discussed  UPT negative in clinic today.   STD testing and WET prep pending  - abstain from sex until all results are known  - urine tests take up to 7 days to result  - vaginal swab tests are a same day result    - this clinic will ONLY call you for POSITIVE = ABNORMAL results.   We will not call you to tell you tests are NEGATIVE = NORMAL.  Vaginal discharge  -     POCT urine pregnancy  -     Wet Prep, Genital  -     Sexually-Transmitted Infections (STIs) Increased Risk Panel    Screening for STD (sexually transmitted disease)  -     Sexually-Transmitted Infections (STIs) Increased Risk Panel

## 2025-08-03 NOTE — PATIENT INSTRUCTIONS
Please follow instructions on patient education material.  Return to urgent care in 2 to 3 days if symptoms are not improving, immediately if you develop any new or worsening symptoms.     STD testing and WET prep pending  - abstain from sex until all results are known  - urine tests take up to 7 days to result  - vaginal swab tests are a same day result    - this clinic will ONLY call you for POSITIVE = ABNORMAL results.   We will not call you to tell you tests are NEGATIVE = NORMAL.    - if you need medication to treat any conditions, it was either prescribed to you today and sent to your pharmacy, or it will be sent when providers view abnormal results.  - if any of your tests are abnormal, we will call you with a plan of care.  - always require condoms  - partners will need treatment if  any +STDs on your testing. They have to have their own visit, we do not automatically treat them.     - If your testing were to come back + gonorrhea and/or chlamydia infections - you are being treated for those with the antibiotic injection and the oral antibiotics sent to your pharmacy. No additional treatment for those infections would be needed.

## 2025-08-04 LAB
C TRACH RRNA UR QL NAA+PROBE: NOT DETECTED
N GONORRHOEA DNA UR QL NAA+PROBE: NOT DETECTED
T VAGINALIS RRNA UR QL NAA+PROBE: NOT DETECTED

## 2025-08-15 ENCOUNTER — CLINICAL SUPPORT (OUTPATIENT)
Dept: URGENT CARE | Facility: CLINIC | Age: 25
End: 2025-08-15
Payer: COMMERCIAL

## 2025-08-15 DIAGNOSIS — Z11.1 ENCOUNTER FOR TB TINE TEST: Primary | ICD-10-CM

## 2025-08-15 PROCEDURE — 99211 OFF/OP EST MAY X REQ PHY/QHP: CPT | Mod: PBBFAC

## 2025-08-15 PROCEDURE — 86580 TB INTRADERMAL TEST: CPT

## 2025-08-15 PROCEDURE — 30200315 PPD INTRADERMAL TEST REV CODE 302

## 2025-08-15 RX ADMIN — TUBERCULIN PURIFIED PROTEIN DERIVATIVE 5 UNITS: 5 INJECTION, SOLUTION INTRADERMAL at 03:08

## 2025-08-17 ENCOUNTER — OFFICE VISIT (OUTPATIENT)
Dept: URGENT CARE | Facility: CLINIC | Age: 25
End: 2025-08-17
Payer: COMMERCIAL

## 2025-08-17 ENCOUNTER — CLINICAL SUPPORT (OUTPATIENT)
Dept: URGENT CARE | Facility: CLINIC | Age: 25
End: 2025-08-17
Payer: COMMERCIAL

## 2025-08-17 VITALS
RESPIRATION RATE: 16 BRPM | OXYGEN SATURATION: 99 % | BODY MASS INDEX: 41.18 KG/M2 | WEIGHT: 278 LBS | HEART RATE: 65 BPM | HEIGHT: 69 IN | TEMPERATURE: 98 F | SYSTOLIC BLOOD PRESSURE: 109 MMHG | DIASTOLIC BLOOD PRESSURE: 80 MMHG

## 2025-08-17 DIAGNOSIS — Z11.1 ENCOUNTER FOR PPD SKIN TEST READING: Primary | ICD-10-CM

## 2025-08-17 DIAGNOSIS — R51.9 MILD HEADACHE: Primary | ICD-10-CM

## 2025-08-17 LAB
TB INDURATION 48 - 72 HR READ: 0 MM
TB SKIN TEST 48 - 72 HR READ: NEGATIVE

## 2025-08-17 PROCEDURE — 99211 OFF/OP EST MAY X REQ PHY/QHP: CPT | Mod: PBBFAC

## 2025-08-17 PROCEDURE — 25000003 PHARM REV CODE 250

## 2025-08-17 PROCEDURE — 99213 OFFICE O/P EST LOW 20 MIN: CPT | Mod: S$PBB,,, | Performed by: NURSE PRACTITIONER

## 2025-08-17 PROCEDURE — 99215 OFFICE O/P EST HI 40 MIN: CPT | Mod: PBBFAC,27 | Performed by: NURSE PRACTITIONER

## 2025-08-17 RX ORDER — IBUPROFEN 600 MG/1
600 TABLET, FILM COATED ORAL
Status: COMPLETED | OUTPATIENT
Start: 2025-08-17 | End: 2025-08-17

## 2025-08-17 RX ADMIN — IBUPROFEN 600 MG: 600 TABLET, FILM COATED ORAL at 03:08
